# Patient Record
Sex: FEMALE | Race: WHITE | URBAN - METROPOLITAN AREA
[De-identification: names, ages, dates, MRNs, and addresses within clinical notes are randomized per-mention and may not be internally consistent; named-entity substitution may affect disease eponyms.]

---

## 2017-01-30 ENCOUNTER — OFFICE VISIT (OUTPATIENT)
Dept: DERMATOLOGY | Facility: CLINIC | Age: 57
End: 2017-01-30

## 2017-01-30 VITALS — SYSTOLIC BLOOD PRESSURE: 107 MMHG | DIASTOLIC BLOOD PRESSURE: 65 MMHG | HEART RATE: 65 BPM

## 2017-01-30 DIAGNOSIS — M35.9 AUTOIMMUNE DISEASE (H): ICD-10-CM

## 2017-01-30 DIAGNOSIS — L21.9 DERMATITIS, SEBORRHEIC: ICD-10-CM

## 2017-01-30 DIAGNOSIS — L63.9 AA (ALOPECIA AREATA): Primary | ICD-10-CM

## 2017-01-30 ASSESSMENT — PAIN SCALES - GENERAL: PAINLEVEL: NO PAIN (0)

## 2017-01-30 NOTE — LETTER
"1/30/2017       RE: Jessica Marina  [address] [facility] [address]     Dear Colleague,    Thank you for referring your patient, Jessica Marina, to the [facility] DERMATOLOGY at [facility]. Please see a copy of my visit note below.    [facility] Dermatology Note    Dermatology Problem List:  1. Alopecia Areata  -current treatment: alternating clobetasol 0.05% shampoo, ketoconazole 2% shampoo, and DHS-Zinc shampoo daily.    -s/p 2cc ILK 1/30/2017  - Derma-smoothe FS oil before and  after hair dye  -clobetasol 0.05% foam, prn for new areas    2. Dermatitis of L thumb (since exposure to cleaning supplies)  -current treatment: 0.1% TAC ointment bid prn    Encounter Date: Jan 30, 2017    CC:  Chief Complaint   Patient presents with     Derm Problem     Patient comes to clinic today for hairloss. States \"it's worse.\"     History of Present Illness:  Ms. Jessica Marina is a 56 year old female who presents as a follow-up for alopecia areata. The patient was last seen 10/10/16 when she received 2cc ILK injections into her scalp.  She is also currently treating her scalp with alternating ketoconazole 2% shampoo, clobetasol 0.05% shampoo, and DHS-zinc shampoo daily.  She is  applying clobetasol 0.05% foam, prn for pruritic patches.  After patient has her hair colored, she uses the Derma-smoothe oil.      Today, patient reports her hair loss has worsened over the last 6 weeks. She has noticed more hair shedding when she showers and brushes her hair. She thinks it is worse around her L temporal scalp area, but has not noticed any new patches of hair loss. She denies any scalp pain or pruritus. She had two UTIs over the last two months that required abx tx. She otherwise  denies any changes in her medical condition or the initiation of new medications.       Past Medical History:   Patient Active Problem List   Diagnosis     AA (alopecia areata)     Papules     " Dermatitis, seborrheic     Autoimmune disease (H)     Pruritus     Dermatitis     History reviewed. No pertinent past medical history.  Past Surgical History   Procedure Laterality Date     No history of surgery  12/16/13     derm     Social History:  The patient works at [employer].  She is originally from [place].     Family History:  There is no family history of skin cancer.    Medications:  Current Outpatient Prescriptions   Medication Sig Dispense Refill     triamcinolone (KENALOG) 0.1 % ointment Apply topically 2 times daily 15 g 1     ketoconazole (NIZORAL) 2 % shampoo Lather into wet hair three times weekly.  Let sit 3-5 minutes before rinsing. 360 mL 4     VITAMIN D, CHOLECALCIFEROL, PO Take by mouth daily       clobetasol propionate (OLUX) 0.05 % FOAM Externally apply topically daily 100 g 5     clobetasol propionate (CLOBEX) 0.05 % SHAM Apply to dry scalp for 15 minutes, then rinse out.  Use once every 48 hours. 3 Bottle 2     Fluocinolone Acetonide (DERMA-SMOOTHE/FS SCALP) 0.01 % OIL Apply once weekly to scalp as needed for itching 1 Bottle 3     Loratadine (CLARITIN PO) Take  by mouth as needed.       fish oil-omega-3 fatty acids (FISH OIL) 1000 MG capsule Take 2 g by mouth daily.       Magnesium 300 MG CAPS Take  by mouth.       No Known Allergies    Review of Systems:  -As per HPI    Physical exam:  Vitals: /65 mmHg  Pulse 65  GEN: This is a well developed, well-nourished female in no acute distress, in a pleasant mood.    SKIN: Focused examination of the face and scalp was performed.  -decreased hair density and thinning predominantly on frontal scalp hairline and R temporoparietal scalp  -mild follicular accentuation present primarily on vertex and bilateral temporoparietal scalp   -2 cm area of decreased hair density on R occipital scalp  -no hair loss on eyebrows or eyelashes  -No other lesions of concern on areas examined.     Impression/Plan:    1. Alopecia areata: Pt reports increase in  shedding over the last 6 weeks. She is unsure of any direct cause of flare-up. She has had 2 UTIs in the last two months that required tx with abx. Comparison with photographs from last visit, however, suggests some clinical improvement in vertex and L temporparietal scalp. R temporoparietal scalp and frontal hairline do show evidence of decrease hair density. Scalp erythema is much improved, although some follicular accentuation remains.    Kenalog intralesional injection procedure note (performed by Dr. Boyd with medical student assistance): After verbal consent and discussion of risks including but not limited to atrophy, pain, and bruising, cleansing with isopropyl alcohol, time out was performed, 2 total cc of Kenalog 10 mg/cc was injected into approximately 20 sites  on the scalp.  The patient tolerated the procedure well and left the Dermatology clinic in good condition.    Continue alternating clobetasol 0.05% shampoo ketoconazole 2% shampoo, and DHS-zinc shampoo daily.      Continue to use derma-smoothe  FS oil on days when  hair is colored      Continue clobetasol 0.05% foam prn for pruritic areas     photographs taken for future reference     Follow-up in 6-7 weeks, or earlier for new or changing lesions.     Staff Involved:  Scribed by Joey Cortez, MS4, on behalf of Dr. Any Boyd MD on 1/30/2017      I agree with the PFSH and ROS as completed by the Medical Student. The remainder of the encounter was performed by me and scribed by the Medical Student. The scribed note accurately reflects my personal services and the medical decisions made by me. ILK injections were primarily done by me.      Any Boyd MD  Professor and Chair  Department of Dermatology  [facility]                Pictures taken of patient today to be placed in chart for future reference.      Again, thank you for allowing me to participate in the care of your patient.      Sincerely,    Any Mo  MD Oliver

## 2017-01-30 NOTE — PROGRESS NOTES
"[facility] Dermatology Note    Dermatology Problem List:  1. Alopecia Areata  -current treatment: alternating clobetasol 0.05% shampoo, ketoconazole 2% shampoo, and DHS-Zinc shampoo daily.    -s/p 2cc ILK 1/30/2017  - Derma-smoothe FS oil before and  after hair dye  -clobetasol 0.05% foam, prn for new areas    2. Dermatitis of L thumb (since exposure to cleaning supplies)  -current treatment: 0.1% TAC ointment bid prn    Encounter Date: Jan 30, 2017    CC:  Chief Complaint   Patient presents with     Derm Problem     Patient comes to clinic today for hairloss. States \"it's worse.\"     History of Present Illness:  Ms. Jessica Marina is a 56 year old female who presents as a follow-up for alopecia areata. The patient was last seen 10/10/16 when she received 2cc ILK injections into her scalp.  She is also currently treating her scalp with alternating ketoconazole 2% shampoo, clobetasol 0.05% shampoo, and DHS-zinc shampoo daily.  She is  applying clobetasol 0.05% foam, prn for pruritic patches.  After patient has her hair colored, she uses the Derma-smoothe oil.      Today, patient reports her hair loss has worsened over the last 6 weeks. She has noticed more hair shedding when she showers and brushes her hair. She thinks it is worse around her L temporal scalp area, but has not noticed any new patches of hair loss. She denies any scalp pain or pruritus. She had two UTIs over the last two months that required abx tx. She otherwise  denies any changes in her medical condition or the initiation of new medications.       Past Medical History:   Patient Active Problem List   Diagnosis     AA (alopecia areata)     Papules     Dermatitis, seborrheic     Autoimmune disease (H)     Pruritus     Dermatitis     History reviewed. No pertinent past medical history.  Past Surgical History   Procedure Laterality Date     No history of surgery  12/16/13     derm     Social History:  The patient works at [employer].  She " is originally from [place].     Family History:  There is no family history of skin cancer.    Medications:  Current Outpatient Prescriptions   Medication Sig Dispense Refill     triamcinolone (KENALOG) 0.1 % ointment Apply topically 2 times daily 15 g 1     ketoconazole (NIZORAL) 2 % shampoo Lather into wet hair three times weekly.  Let sit 3-5 minutes before rinsing. 360 mL 4     VITAMIN D, CHOLECALCIFEROL, PO Take by mouth daily       clobetasol propionate (OLUX) 0.05 % FOAM Externally apply topically daily 100 g 5     clobetasol propionate (CLOBEX) 0.05 % SHAM Apply to dry scalp for 15 minutes, then rinse out.  Use once every 48 hours. 3 Bottle 2     Fluocinolone Acetonide (DERMA-SMOOTHE/FS SCALP) 0.01 % OIL Apply once weekly to scalp as needed for itching 1 Bottle 3     Loratadine (CLARITIN PO) Take  by mouth as needed.       fish oil-omega-3 fatty acids (FISH OIL) 1000 MG capsule Take 2 g by mouth daily.       Magnesium 300 MG CAPS Take  by mouth.       No Known Allergies    Review of Systems:  -As per HPI    Physical exam:  Vitals: /65 mmHg  Pulse 65  GEN: This is a well developed, well-nourished female in no acute distress, in a pleasant mood.    SKIN: Focused examination of the face and scalp was performed.  -decreased hair density and thinning predominantly on frontal scalp hairline and R temporoparietal scalp  -mild follicular accentuation present primarily on vertex and bilateral temporoparietal scalp   -2 cm area of decreased hair density on R occipital scalp  -no hair loss on eyebrows or eyelashes  -No other lesions of concern on areas examined.     Impression/Plan:    1. Alopecia areata: Pt reports increase in shedding over the last 6 weeks. She is unsure of any direct cause of flare-up. She has had 2 UTIs in the last two months that required tx with abx. Comparison with photographs from last visit, however, suggests some clinical improvement in vertex and L temporparietal scalp. R  temporoparietal scalp and frontal hairline do show evidence of decrease hair density. Scalp erythema is much improved, although some follicular accentuation remains.    Kenalog intralesional injection procedure note (performed by Dr. Boyd with medical student assistance): After verbal consent and discussion of risks including but not limited to atrophy, pain, and bruising, cleansing with isopropyl alcohol, time out was performed, 2 total cc of Kenalog 10 mg/cc was injected into approximately 20 sites  on the scalp.  The patient tolerated the procedure well and left the Dermatology clinic in good condition.    Continue alternating clobetasol 0.05% shampoo ketoconazole 2% shampoo, and DHS-zinc shampoo daily.      Continue to use derma-smoothe  FS oil on days when  hair is colored      Continue clobetasol 0.05% foam prn for pruritic areas     photographs taken for future reference     Follow-up in 6-7 weeks, or earlier for new or changing lesions.     Staff Involved:  Scribed by Joey Cortez, MS4, on behalf of Dr. Any Boyd MD on 1/30/2017      I agree with the PFSH and ROS as completed by the Medical Student. The remainder of the encounter was performed by me and scribed by the Medical Student. The scribed note accurately reflects my personal services and the medical decisions made by me. ILK injections were primarily done by me.      Any Boyd MD  Professor and Chair  Department of Dermatology  [facility]

## 2017-01-30 NOTE — PATIENT INSTRUCTIONS
-Return in 6-7 weeks. Feel free to cancel your appt and make it farther away if you are improving satisfactorily.  -Continue to use your 3 shampoo cycle. You are doing a great job!

## 2017-01-30 NOTE — MR AVS SNAPSHOT
After Visit Summary   1/30/2017    Jessica Marina    MRN: [id number]           Patient Information     Date Of Birth          1960        Visit Information        Provider Department      1/30/2017 8:45 AM Any Boyd MD [facility] Dermatology        Today's Diagnoses     AA (alopecia areata)    -  1    Dermatitis, seborrheic        Autoimmune disease (H)          Care Instructions    -Return in 6-7 weeks. Feel free to cancel your appt and make it farther away if you are improving satisfactorily.  -Continue to use your 3 shampoo cycle. You are doing a great job!        Follow-ups after your visit        Your next 10 appointments already scheduled     May 03, 2017  8:45 AM CDT   (Arrive by 8:30 AM)   Return Visit with CELIA Galvez [facility] Dermatology ([facility])    [address] [place] [id number] [phone]              Who to contact     Please call your clinic at [phone] to:    Ask questions about your health    Make or cancel appointments    Discuss your medicines    Learn about your test results    Speak to your doctor   If you have compliments or concerns about an experience at your clinic, or if you wish to file a complaint, please contact [facility] Patient Relations at [phone] or email us at [email or web address]         Additional Information About Your Visit        MyChart Information     [employer] is an electronic gateway that provides easy, online access to your medical records. With [employer] you can request a clinic appointment, read your test results, renew a prescription or communicate with your care team.     To sign up for [employer] visit the website at [email or web address]   You will be asked to enter the access code listed below, as well as some personal information. Please follow the directions to create your username and password.      Your access code is: [email or web address]  Expires: 2017  7:30 AM     Your access code will  in 90 days. If you need help or a new code, please contact your [facility] or call [phone] for assistance.        Care EveryWhere ID     This is your Care EveryWhere ID. This could be used by other organizations to access your [facility] medical records  [id number]        Your Vitals Were     Pulse                   65            Blood Pressure from Last 3 Encounters:   17 107/65   10/10/16 98/59   16 97/66    Weight from Last 3 Encounters:   16 54.7 kg (120 lb 9.6 oz)   12/07/15 53.5 kg (118 lb)   09/03/15 53 kg (116 lb 12.8 oz)              We Performed the Following     INJECTION INTO SKIN LESIONS >7          Today's Medication Changes          These changes are accurate as of: 17 11:59 PM.  If you have any questions, ask your nurse or doctor.               Start taking these medicines.        Dose/Directions    triamcinolone acetonide 10 MG/ML injection   Commonly known as:  KENALOG   Used for:  AA (alopecia areata)   Started by:  Any Boyd MD        See med note   Quantity:  5 mL   Refills:  0            Where to get your medicines      Some of these will need a paper prescription and others can be bought over the counter.  Ask your nurse if you have questions.     You don't need a prescription for these medications     triamcinolone acetonide 10 MG/ML injection                Primary Care Provider    None Specified       No primary provider on file.        Thank you!     Thank you for choosing [facility] DERMATOLOGY  for your care. Our goal is always to provide you with excellent care. Hearing back from our patients is one way we can continue to improve our services. Please take a few minutes to complete the written survey that you may receive in the mail after your visit with us. Thank you!             Your Updated Medication List -  Protect others around you: Learn how to safely use, store and throw away your medicines at www.disposemymeds.org.          This list is accurate as of: 1/30/17 11:59 PM.  Always use your most recent med list.                   Brand Name Dispense Instructions for use    CLARITIN PO      Take  by mouth as needed.       * clobetasol propionate 0.05 % Foam    OLUX    100 g    Externally apply topically daily       * clobetasol propionate 0.05 % Sham    CLOBEX    3 Bottle    Apply to dry scalp for 15 minutes, then rinse out.  Use once every 48 hours.       DERMA-SMOOTHE/FS SCALP 0.01 % Oil     1 Bottle    Apply once weekly to scalp as needed for itching       fish oil-omega-3 fatty acids 1000 MG capsule      Take 2 g by mouth daily.       ketoconazole 2 % shampoo    NIZORAL    360 mL    Lather into wet hair three times weekly.  Let sit 3-5 minutes before rinsing.       Magnesium 300 MG Caps      Take  by mouth.       triamcinolone 0.1 % ointment    KENALOG    15 g    Apply topically 2 times daily       triamcinolone acetonide 10 MG/ML injection    KENALOG    5 mL    See med note       VITAMIN D (CHOLECALCIFEROL) PO      Take by mouth daily       * Notice:  This list has 2 medication(s) that are the same as other medications prescribed for you. Read the directions carefully, and ask your doctor or other care provider to review them with you.

## 2017-01-30 NOTE — NURSING NOTE
"Dermatology Rooming Note    Jessica Marina's goals for this visit include:   Chief Complaint   Patient presents with     Derm Problem     Patient comes to clinic today for hairloss. States \"it's worse.\"     Skye Perez, [facility]    "

## 2017-03-08 ENCOUNTER — OFFICE VISIT (OUTPATIENT)
Dept: DERMATOLOGY | Facility: CLINIC | Age: 57
End: 2017-03-08

## 2017-03-08 DIAGNOSIS — L63.9 ALOPECIA AREATA: Primary | ICD-10-CM

## 2017-03-08 ASSESSMENT — PAIN SCALES - GENERAL: PAINLEVEL: NO PAIN (0)

## 2017-03-08 NOTE — LETTER
"3/8/2017       RE: Jessica Marina  [address] [facility] [address] [facility] [address]     Dear Colleague,    Thank you for referring your patient, Jessica Marina, to the [facility] DERMATOLOGY at [facility]. Please see a copy of my visit note below.                      Pictures were placed in Pt's chart today for future reference.          [facility] Dermatology Note    Dermatology Problem List:  1. Alopecia Areata  -current treatment: alternating clobetasol 0.05% shampoo, ketoconazole 2% shampoo, and DHS-Zinc shampoo daily.    -s/p 2cc ILK 1/30/2017  - Derma-smoothe oil after hair dye  -clobetasol 0.05% foam, prn for new areas    2. Dermatitis of L thumb (since exposure to cleaning supplies)  -current treatment: 0.1% TAC ointment bid prn    Encounter Date: Mar 8, 2017    CC:  Chief Complaint   Patient presents with     Hair Loss     Hair loss, Jessica states \" I notice less falling out.\"     History of Present Illness:  Ms. Jessica Marina is a 57 year old female who presents as a follow-up for alopecia areata. The patient was last seen 1/30/2017 when  she received 2cc ILK injections into her scalp. She states she has noticed less hair falling from last visit but still feels like her hair is thin in the frontal areas. She is also currently treating her scalp with alternating ketoconazole 2% shampoo, clobetasol 0.05% shampoo, and DHS-zinc shampoo daily.  She is applying clobetasol 0.05% foam, prn for pruritic patches.  After patient has her hair colored, she uses the Derma-smoothe oil. She plans on having her hair colored at the end of the week.     She had an evaluation for her chronic UTIs with no concerning findings. She denies any abominal pain, fever or unexplained weight loss. She is feeling well.       Past Medical History:   Patient Active Problem List   Diagnosis     AA (alopecia areata)     Papules     Dermatitis, seborrheic     Autoimmune disease (H)     " Pruritus     Dermatitis     No past medical history on file.  Past Surgical History   Procedure Laterality Date     No history of surgery  12/16/13     derm     Social History:  The patient works at [employer].  She is originally from [place].     Family History:  There is no family history of skin cancer.    Medications:  Current Outpatient Prescriptions   Medication Sig Dispense Refill     triamcinolone (KENALOG) 0.1 % ointment Apply topically 2 times daily 15 g 1     ketoconazole (NIZORAL) 2 % shampoo Lather into wet hair three times weekly.  Let sit 3-5 minutes before rinsing. 360 mL 4     VITAMIN D, CHOLECALCIFEROL, PO Take by mouth daily       clobetasol propionate (OLUX) 0.05 % FOAM Externally apply topically daily 100 g 5     clobetasol propionate (CLOBEX) 0.05 % SHAM Apply to dry scalp for 15 minutes, then rinse out.  Use once every 48 hours. 3 Bottle 2     Fluocinolone Acetonide (DERMA-SMOOTHE/FS SCALP) 0.01 % OIL Apply once weekly to scalp as needed for itching 1 Bottle 3     Loratadine (CLARITIN PO) Take  by mouth as needed.       fish oil-omega-3 fatty acids (FISH OIL) 1000 MG capsule Take 2 g by mouth daily.       Magnesium 300 MG CAPS Take  by mouth.       No Known Allergies    Review of Systems:  -As per HPI    Physical exam:  Vitals: There were no vitals taken for this visit.  GEN: This is a well developed, well-nourished female in no acute distress, in a pleasant mood.    SKIN: Focused examination of the face and scalp was performed.  -Decreased hair density and thinning predominantly on frontal scalp hairline and bilateral temporoparietal scalp  -mild follicular accentuation present primarily on the bilateral temporoparietal scalp   -no hair loss on eyebrows or eyelashes  -No other lesions of concern on areas examined.     Impression/Plan:    1. Alopecia areata: Improvement compared to last visit especially the occipital scalp does not show any decreased hair density.  No scalp erythema today.      Kenalog intralesional injection procedure note (performed by me): After verbal consent and discussion of risks including but not limited to atrophy, pain, and bruising, cleansing with isopropyl alcohol, time out was performed, 2 total cc of Kenalog 10 mg/cc was injected into approximately 20 sites  on the scalp.  The patient tolerated the procedure well and left the Dermatology clinic in good condition.    Continue alternating clobetasol 0.05% shampoo ketoconazole 2% shampoo, and DHS-zinc shampoo daily.      Continue to use derma-smoothe oil on days when  hair is colored      Continue clobetasol 0.05% foam prn for pruritic areas     photographs taken for future reference     Follow-up in 8 weeks, or earlier for new or changing lesions.     Staff Involved:    All risks, benefits and alternatives were discussed with patient.  Patient is in agreement and understands the assessment and plan.  All questions were answered.  Sun Screen Education was given.   Return to Clinic in 8 weeks or sooner as needed.   Xi Fish PA-C

## 2017-03-08 NOTE — PROGRESS NOTES
"[facility] Dermatology Note    Dermatology Problem List:  1. Alopecia Areata  -current treatment: alternating clobetasol 0.05% shampoo, ketoconazole 2% shampoo, and DHS-Zinc shampoo daily.    -s/p 2cc ILK 1/30/2017  - Derma-smoothe oil after hair dye  -clobetasol 0.05% foam, prn for new areas    2. Dermatitis of L thumb (since exposure to cleaning supplies)  -current treatment: 0.1% TAC ointment bid prn    Encounter Date: Mar 8, 2017    CC:  Chief Complaint   Patient presents with     Hair Loss     Hair loss, Jessica states \" I notice less falling out.\"     History of Present Illness:  Ms. Jessica Marina is a 57 year old female who presents as a follow-up for alopecia areata. The patient was last seen 1/30/2017 when  she received 2cc ILK injections into her scalp. She states she has noticed less hair falling from last visit but still feels like her hair is thin in the frontal areas. She is also currently treating her scalp with alternating ketoconazole 2% shampoo, clobetasol 0.05% shampoo, and DHS-zinc shampoo daily.  She is applying clobetasol 0.05% foam, prn for pruritic patches.  After patient has her hair colored, she uses the Derma-smoothe oil. She plans on having her hair colored at the end of the week.     She had an evaluation for her chronic UTIs with no concerning findings. She denies any abominal pain, fever or unexplained weight loss. She is feeling well.       Past Medical History:   Patient Active Problem List   Diagnosis     AA (alopecia areata)     Papules     Dermatitis, seborrheic     Autoimmune disease (H)     Pruritus     Dermatitis     No past medical history on file.  Past Surgical History   Procedure Laterality Date     No history of surgery  12/16/13     derm     Social History:  The patient works at [employer].  She is originally from [place].     Family History:  There is no family history of skin cancer.    Medications:  Current Outpatient Prescriptions   Medication Sig " Dispense Refill     triamcinolone (KENALOG) 0.1 % ointment Apply topically 2 times daily 15 g 1     ketoconazole (NIZORAL) 2 % shampoo Lather into wet hair three times weekly.  Let sit 3-5 minutes before rinsing. 360 mL 4     VITAMIN D, CHOLECALCIFEROL, PO Take by mouth daily       clobetasol propionate (OLUX) 0.05 % FOAM Externally apply topically daily 100 g 5     clobetasol propionate (CLOBEX) 0.05 % SHAM Apply to dry scalp for 15 minutes, then rinse out.  Use once every 48 hours. 3 Bottle 2     Fluocinolone Acetonide (DERMA-SMOOTHE/FS SCALP) 0.01 % OIL Apply once weekly to scalp as needed for itching 1 Bottle 3     Loratadine (CLARITIN PO) Take  by mouth as needed.       fish oil-omega-3 fatty acids (FISH OIL) 1000 MG capsule Take 2 g by mouth daily.       Magnesium 300 MG CAPS Take  by mouth.       No Known Allergies    Review of Systems:  -As per HPI    Physical exam:  Vitals: There were no vitals taken for this visit.  GEN: This is a well developed, well-nourished female in no acute distress, in a pleasant mood.    SKIN: Focused examination of the face and scalp was performed.  -Decreased hair density and thinning predominantly on frontal scalp hairline and bilateral temporoparietal scalp  -mild follicular accentuation present primarily on the bilateral temporoparietal scalp   -no hair loss on eyebrows or eyelashes  -No other lesions of concern on areas examined.     Impression/Plan:    1. Alopecia areata: Improvement compared to last visit especially the occipital scalp does not show any decreased hair density.  No scalp erythema today.     Kenalog intralesional injection procedure note (performed by me): After verbal consent and discussion of risks including but not limited to atrophy, pain, and bruising, cleansing with isopropyl alcohol, time out was performed, 2 total cc of Kenalog 10 mg/cc was injected into approximately 20 sites  on the scalp.  The patient tolerated the procedure well and left the  Dermatology clinic in good condition.    Continue alternating clobetasol 0.05% shampoo ketoconazole 2% shampoo, and DHS-zinc shampoo daily.      Continue to use derma-smoothe oil on days when  hair is colored      Continue clobetasol 0.05% foam prn for pruritic areas     photographs taken for future reference     Follow-up in 8 weeks, or earlier for new or changing lesions.     Staff Involved:    All risks, benefits and alternatives were discussed with patient.  Patient is in agreement and understands the assessment and plan.  All questions were answered.  Sun Screen Education was given.   Return to Clinic in 8 weeks or sooner as needed.   Xi Fish PA-C

## 2017-03-08 NOTE — MR AVS SNAPSHOT
After Visit Summary   3/8/2017    Jessica Marina    MRN: [id number]           Patient Information     Date Of Birth          1960        Visit Information        Provider Department      3/8/2017 9:00 AM Xi Fish PA-C [facility] Dermatology        Today's Diagnoses     Alopecia areata    -  1       Follow-ups after your visit        Follow-up notes from your care team     Return in about 8 weeks (around 5/3/2017).      Your next 10 appointments already scheduled     May 03, 2017  8:45 AM CDT   (Arrive by 8:30 AM)   Return Visit with CELIA Galvez [facility] Dermatology ([facility])    [address] [address] [phone]              Who to contact     Please call your clinic at [phone] to:    Ask questions about your health    Make or cancel appointments    Discuss your medicines    Learn about your test results    Speak to your doctor   If you have compliments or concerns about an experience at your clinic, or if you wish to file a complaint, please contact [facility] Patient Relations at [phone] or email us at [email or web address]         Additional Information About Your Visit        MyChart Information     CenTrakt is an electronic gateway that provides easy, online access to your medical records. With [employer], you can request a clinic appointment, read your test results, renew a prescription or communicate with your care team.     To sign up for CenTrakt visit the website at [email or web address]   You will be asked to enter the access code listed below, as well as some personal information. Please follow the directions to create your username and password.     Your access code is: [email or web address]  Expires: 2017  6:30 AM     Your access code will  in 90 days. If you need help or a new code, please contact your [facility]  Clinic or call [phone] for assistance.        Care EveryWhere ID     This is your Care EveryWhere ID. This could be used by other organizations to access your [place] medical records  [id number]         Blood Pressure from Last 3 Encounters:   01/30/17 107/65   10/10/16 98/59   07/18/16 97/66    Weight from Last 3 Encounters:   05/31/16 54.7 kg (120 lb 9.6 oz)   12/07/15 53.5 kg (118 lb)   09/03/15 53 kg (116 lb 12.8 oz)              We Performed the Following     INJECTION INTO SKIN LESIONS >7          Today's Medication Changes          These changes are accurate as of: 3/8/17 11:59 PM.  If you have any questions, ask your nurse or doctor.               Start taking these medicines.        Dose/Directions    triamcinolone acetonide 10 MG/ML injection   Commonly known as:  KENALOG   Used for:  Alopecia areata        Dose:  10 mg   Inject 1 mL (10 mg) into the skin once for 1 dose   Quantity:  5 mL   Refills:  0            Where to get your medicines      Some of these will need a paper prescription and others can be bought over the counter.  Ask your nurse if you have questions.     You don't need a prescription for these medications     triamcinolone acetonide 10 MG/ML injection                Primary Care Provider    None Specified       No primary provider on file.        Thank you!     Thank you for choosing [facility] DERMATOLOGY  for your care. Our goal is always to provide you with excellent care. Hearing back from our patients is one way we can continue to improve our services. Please take a few minutes to complete the written survey that you may receive in the mail after your visit with us. Thank you!             Your Updated Medication List - Protect others around you: Learn how to safely use, store and throw away your medicines at www.disposemymeds.org.          This list is accurate as of: 3/8/17 11:59 PM.  Always use your most recent med list.                   Brand Name Dispense Instructions  for use    CLARITIN PO      Take  by mouth as needed.       * clobetasol propionate 0.05 % Foam    OLUX    100 g    Externally apply topically daily       * clobetasol propionate 0.05 % Sham    CLOBEX    3 Bottle    Apply to dry scalp for 15 minutes, then rinse out.  Use once every 48 hours.       DERMA-SMOOTHE/FS SCALP 0.01 % Oil     1 Bottle    Apply once weekly to scalp as needed for itching       fish oil-omega-3 fatty acids 1000 MG capsule      Take 2 g by mouth daily.       ketoconazole 2 % shampoo    NIZORAL    360 mL    Lather into wet hair three times weekly.  Let sit 3-5 minutes before rinsing.       Magnesium 300 MG Caps      Take  by mouth.       triamcinolone 0.1 % ointment    KENALOG    15 g    Apply topically 2 times daily       triamcinolone acetonide 10 MG/ML injection    KENALOG    5 mL    Inject 1 mL (10 mg) into the skin once for 1 dose       VITAMIN D (CHOLECALCIFEROL) PO      Take by mouth daily       * Notice:  This list has 2 medication(s) that are the same as other medications prescribed for you. Read the directions carefully, and ask your doctor or other care provider to review them with you.

## 2017-03-08 NOTE — NURSING NOTE
"Dermatology Rooming Note    Jessica Marina's goals for this visit include:   Chief Complaint   Patient presents with     Hair Loss     Hair loss, Jessica states \" I notice less falling out.\"     Maribel Washington LPN  "

## 2017-03-08 NOTE — NURSING NOTE
Drug Administration Record    Drug Name: triamcinolone acetonide(kenalog)  Dose:2 mL  Route administered: ID  NDC #: Kenalog-10 ([address]-70)  Amount of waste(mL):3  Reason for waste: Single use vial

## 2017-05-03 ENCOUNTER — OFFICE VISIT (OUTPATIENT)
Dept: DERMATOLOGY | Facility: CLINIC | Age: 57
End: 2017-05-03

## 2017-05-03 DIAGNOSIS — L63.9 ALOPECIA AREATA: Primary | ICD-10-CM

## 2017-05-03 ASSESSMENT — PAIN SCALES - GENERAL: PAINLEVEL: NO PAIN (0)

## 2017-05-03 NOTE — PROGRESS NOTES
"[facility] Dermatology Note    Dermatology Problem List:  1. Alopecia Areata  -current treatment: alternating clobetasol 0.05% shampoo, ketoconazole 2% shampoo, and DHS-Zinc shampoo daily.    -s/p 2cc ILK 1/30/2017  - Derma-smoothe oil after hair dye  -clobetasol 0.05% foam, prn for new areas    2. Dermatitis of L thumb (since exposure to cleaning supplies)  -current treatment: 0.1% TAC ointment bid prn    Encounter Date: May 3, 2017    CC:  Chief Complaint   Patient presents with     Hair Loss     Hair loss, Jessica states \" it is still coming out.\"     History of Present Illness:  Ms. Jessica Marina is a 57 year old female who presents as a follow-up for alopecia areata. The patient was last seen 3/8/2017 when she had 2 cc ILK injections into her scalp. She states she has noticed less hair falling from last visit but still feels like her hair is thin in the frontal areas. She is also currently treating her scalp with alternating ketoconazole 2% shampoo, clobetasol 0.05% shampoo, and DHS-zinc shampoo daily.  She is applying clobetasol 0.05% foam, prn for pruritic patches.  After patient has her hair colored, she uses the Derma-smoothe oil.      She has no other skin concerns. She is feeling well.       Past Medical History:   Patient Active Problem List   Diagnosis     AA (alopecia areata)     Papules     Dermatitis, seborrheic     Autoimmune disease (H)     Pruritus     Dermatitis     No past medical history on file.  Past Surgical History:   Procedure Laterality Date     NO HISTORY OF SURGERY  12/16/13    derm     Social History:  The patient works at [employer].  She is originally from [place].     Family History:  There is no family history of skin cancer.    Medications:  Current Outpatient Prescriptions   Medication Sig Dispense Refill     triamcinolone (KENALOG) 0.1 % ointment Apply topically 2 times daily 15 g 1     ketoconazole (NIZORAL) 2 % shampoo Lather into wet hair three times " weekly.  Let sit 3-5 minutes before rinsing. 360 mL 4     VITAMIN D, CHOLECALCIFEROL, PO Take by mouth daily       clobetasol propionate (OLUX) 0.05 % FOAM Externally apply topically daily 100 g 5     clobetasol propionate (CLOBEX) 0.05 % SHAM Apply to dry scalp for 15 minutes, then rinse out.  Use once every 48 hours. 3 Bottle 2     Fluocinolone Acetonide (DERMA-SMOOTHE/FS SCALP) 0.01 % OIL Apply once weekly to scalp as needed for itching 1 Bottle 3     Loratadine (CLARITIN PO) Take  by mouth as needed.       fish oil-omega-3 fatty acids (FISH OIL) 1000 MG capsule Take 2 g by mouth daily.       Magnesium 300 MG CAPS Take  by mouth.       Allergies   Allergen Reactions     Trifluridine      PN: LW Reaction: CAUSES HERPES SYMPTOMS       Review of Systems:  -As per HPI    Physical exam:  Vitals: There were no vitals taken for this visit.  GEN: This is a well developed, well-nourished female in no acute distress, in a pleasant mood.    SKIN: Focused examination of the face and scalp was performed.  There is evidence of regrowth with central part regrowth layers of 1,2 and 3 cm.   -Decreased hair density and thinning predominantly on frontal scalp hairline and bilateral temporoparietal scalp  -mild follicular accentuation present primarily on the bilateral temporoparietal scalp   -no hair loss on eyebrows or eyelashes  -No other lesions of concern on areas examined.     Impression/Plan:    1. Alopecia areata: Evidence of regrowth but continued thinning through out the bilateral temporoparietal compared to last visit. No scalp erythema today.     Kenalog intralesional injection procedure note (performed by me): After verbal consent and discussion of risks including but not limited to atrophy, pain, and bruising, cleansing with isopropyl alcohol, time out was performed, 3 total cc of Kenalog 10 mg/cc was injected into approximately 35 sites  on the scalp.  The patient tolerated the procedure well and left the Dermatology  clinic in good condition.    Continue alternating clobetasol 0.05% shampoo ketoconazole 2% shampoo, and DHS-zinc shampoo daily.      Continue to use derma-smoothe oil on days when  hair is colored      Continue clobetasol 0.05% foam prn for pruritic areas     photographs taken for future reference     Follow-up in 8 weeks, or earlier for new or changing lesions.     Staff Involved:    All risks, benefits and alternatives were discussed with patient.  Patient is in agreement and understands the assessment and plan.  All questions were answered.  Sun Screen Education was given.   Return to Clinic in 8 weeks or sooner as needed.   Xi Fish PA-C

## 2017-05-03 NOTE — MR AVS SNAPSHOT
After Visit Summary   5/3/2017    Jessica Marina    MRN: [id number]           Patient Information     Date Of Birth          1960        Visit Information        Provider Department      5/3/2017 8:45 AM Xi Fish PA-C [facility] Dermatology        Today's Diagnoses     Alopecia areata    -  1       Follow-ups after your visit        Follow-up notes from your care team     Return in about 8 weeks (around 2017).      Your next 10 appointments already scheduled     2017  7:30 AM CDT   (Arrive by 7:15 AM)   RETURN HAIRLOSS with Any Boyd MD   [facility] Dermatology ([facility] [place])    [address] [place] [address] [phone]              Who to contact     Please call your clinic at [phone] to:    Ask questions about your health    Make or cancel appointments    Discuss your medicines    Learn about your test results    Speak to your doctor   If you have compliments or concerns about an experience at your clinic, or if you wish to file a complaint, please contact [facility] Patient Relations at [phone] or email us at [email or web address]         Additional Information About Your Visit        MyChart Information     StudioSnapst is an electronic gateway that provides easy, online access to your medical records. With [employer], you can request a clinic appointment, read your test results, renew a prescription or communicate with your care team.     To sign up for Studio[employer]st visit the website at [email or web address]   You will be asked to enter the access code listed below, as well as some personal information. Please follow the directions to create your username and password.     Your access code is: [email or web address]  Expires: 2017  6:31 AM     Your access code will  in 90 days. If you need help or a new code, please contact your [facility]  Physicians Clinic or call [phone] for assistance.        Care EveryWhere ID     This is your Care EveryWhere ID. This could be used by other organizations to access your [place] medical records  [id number]         Blood Pressure from Last 3 Encounters:   01/30/17 107/65   10/10/16 98/59   07/18/16 97/66    Weight from Last 3 Encounters:   05/31/16 54.7 kg (120 lb 9.6 oz)   12/07/15 53.5 kg (118 lb)   09/03/15 53 kg (116 lb 12.8 oz)              We Performed the Following     INJECTION INTO SKIN LESIONS <=7          Today's Medication Changes          These changes are accurate as of: 5/3/17 11:59 PM.  If you have any questions, ask your nurse or doctor.               Start taking these medicines.        Dose/Directions    triamcinolone acetonide 10 MG/ML injection   Commonly known as:  KENALOG   Used for:  Alopecia areata        Dose:  10 mg   Inject 1 mL (10 mg) into the skin once for 1 dose   Quantity:  5 mL   Refills:  0            Where to get your medicines      Some of these will need a paper prescription and others can be bought over the counter.  Ask your nurse if you have questions.     You don't need a prescription for these medications     triamcinolone acetonide 10 MG/ML injection                Primary Care Provider    None Specified       No primary provider on file.        Thank you!     Thank you for choosing [facility] DERMATOLOGY  for your care. Our goal is always to provide you with excellent care. Hearing back from our patients is one way we can continue to improve our services. Please take a few minutes to complete the written survey that you may receive in the mail after your visit with us. Thank you!             Your Updated Medication List - Protect others around you: Learn how to safely use, store and throw away your medicines at www.disposemymeds.org.          This list is accurate as of: 5/3/17 11:59 PM.  Always use your most recent med list.                   Brand Name Dispense  Instructions for use    CLARITIN PO      Take  by mouth as needed.       * clobetasol propionate 0.05 % Foam    OLUX    100 g    Externally apply topically daily       * clobetasol propionate 0.05 % Sham    CLOBEX    3 Bottle    Apply to dry scalp for 15 minutes, then rinse out.  Use once every 48 hours.       DERMA-SMOOTHE/FS SCALP 0.01 % Oil     1 Bottle    Apply once weekly to scalp as needed for itching       fish oil-omega-3 fatty acids 1000 MG capsule      Take 2 g by mouth daily.       ketoconazole 2 % shampoo    NIZORAL    360 mL    Lather into wet hair three times weekly.  Let sit 3-5 minutes before rinsing.       Magnesium 300 MG Caps      Take  by mouth.       triamcinolone 0.1 % ointment    KENALOG    15 g    Apply topically 2 times daily       triamcinolone acetonide 10 MG/ML injection    KENALOG    5 mL    Inject 1 mL (10 mg) into the skin once for 1 dose       VITAMIN D (CHOLECALCIFEROL) PO      Take by mouth daily       * Notice:  This list has 2 medication(s) that are the same as other medications prescribed for you. Read the directions carefully, and ask your doctor or other care provider to review them with you.

## 2017-05-03 NOTE — NURSING NOTE
"Dermatology Rooming Note    Jessica Marina's goals for this visit include:   Chief Complaint   Patient presents with     Hair Loss     Hair loss, Jessica states \" it is still coming out.\"     Maribel Washington LPN  "

## 2017-05-03 NOTE — LETTER
"5/3/2017       RE: Jessica Marina  [address] [facility] [address]     Dear Colleague,    Thank you for referring your patient, Jessica Marina, to the [facility] DERMATOLOGY at [facility]. Please see a copy of my visit note below.                          Pictures were placed in Pt's chart today for future reference.      [facility] Dermatology Note    Dermatology Problem List:  1. Alopecia Areata  -current treatment: alternating clobetasol 0.05% shampoo, ketoconazole 2% shampoo, and DHS-Zinc shampoo daily.    -s/p 2cc ILK 1/30/2017  - Derma-smoothe oil after hair dye  -clobetasol 0.05% foam, prn for new areas    2. Dermatitis of L thumb (since exposure to cleaning supplies)  -current treatment: 0.1% TAC ointment bid prn    Encounter Date: May 3, 2017    CC:  Chief Complaint   Patient presents with     Hair Loss     Hair loss, Jessica states \" it is still coming out.\"     History of Present Illness:  Ms. Jessica Marina is a 57 year old female who presents as a follow-up for alopecia areata. The patient was last seen 3/8/2017 when she had 2 cc ILK injections into her scalp. She states she has noticed less hair falling from last visit but still feels like her hair is thin in the frontal areas. She is also currently treating her scalp with alternating ketoconazole 2% shampoo, clobetasol 0.05% shampoo, and DHS-zinc shampoo daily.  She is applying clobetasol 0.05% foam, prn for pruritic patches.  After patient has her hair colored, she uses the Derma-smoothe oil.      She has no other skin concerns. She is feeling well.       Past Medical History:   Patient Active Problem List   Diagnosis     AA (alopecia areata)     Papules     Dermatitis, seborrheic     Autoimmune disease (H)     Pruritus     Dermatitis     No past medical history on file.  Past Surgical History:   Procedure Laterality Date     NO HISTORY OF SURGERY  12/16/13    derm     Social " History:  The patient works at [employer].  She is originally from [place].     Family History:  There is no family history of skin cancer.    Medications:  Current Outpatient Prescriptions   Medication Sig Dispense Refill     triamcinolone (KENALOG) 0.1 % ointment Apply topically 2 times daily 15 g 1     ketoconazole (NIZORAL) 2 % shampoo Lather into wet hair three times weekly.  Let sit 3-5 minutes before rinsing. 360 mL 4     VITAMIN D, CHOLECALCIFEROL, PO Take by mouth daily       clobetasol propionate (OLUX) 0.05 % FOAM Externally apply topically daily 100 g 5     clobetasol propionate (CLOBEX) 0.05 % SHAM Apply to dry scalp for 15 minutes, then rinse out.  Use once every 48 hours. 3 Bottle 2     Fluocinolone Acetonide (DERMA-SMOOTHE/FS SCALP) 0.01 % OIL Apply once weekly to scalp as needed for itching 1 Bottle 3     Loratadine (CLARITIN PO) Take  by mouth as needed.       fish oil-omega-3 fatty acids (FISH OIL) 1000 MG capsule Take 2 g by mouth daily.       Magnesium 300 MG CAPS Take  by mouth.       Allergies   Allergen Reactions     Trifluridine      PN: LW Reaction: CAUSES HERPES SYMPTOMS       Review of Systems:  -As per HPI    Physical exam:  Vitals: There were no vitals taken for this visit.  GEN: This is a well developed, well-nourished female in no acute distress, in a pleasant mood.    SKIN: Focused examination of the face and scalp was performed.  There is evidence of regrowth with central part regrowth layers of 1,2 and 3 cm.   -Decreased hair density and thinning predominantly on frontal scalp hairline and bilateral temporoparietal scalp  -mild follicular accentuation present primarily on the bilateral temporoparietal scalp   -no hair loss on eyebrows or eyelashes  -No other lesions of concern on areas examined.     Impression/Plan:    1. Alopecia areata: Evidence of regrowth but continued thinning through out the bilateral temporoparietal compared to last visit. No scalp erythema today.     Kenalog  intralesional injection procedure note (performed by me): After verbal consent and discussion of risks including but not limited to atrophy, pain, and bruising, cleansing with isopropyl alcohol, time out was performed, 3 total cc of Kenalog 10 mg/cc was injected into approximately 35 sites  on the scalp.  The patient tolerated the procedure well and left the Dermatology clinic in good condition.    Continue alternating clobetasol 0.05% shampoo ketoconazole 2% shampoo, and DHS-zinc shampoo daily.      Continue to use derma-smoothe oil on days when  hair is colored      Continue clobetasol 0.05% foam prn for pruritic areas     photographs taken for future reference     Follow-up in 8 weeks, or earlier for new or changing lesions.     Staff Involved:    All risks, benefits and alternatives were discussed with patient.  Patient is in agreement and understands the assessment and plan.  All questions were answered.  Sun Screen Education was given.   Return to Clinic in 8 weeks or sooner as needed.   Xi Fish PA-C

## 2017-05-03 NOTE — NURSING NOTE
Drug Administration Record    Drug Name: triamcinolone acetonide(kenalog)  Dose:3 mL  Route administered: ID  NDC #: Kenalog-10 ([address]-28)  Amount of waste(mL):2  Reason for waste: Single use vial

## 2017-06-12 ENCOUNTER — OFFICE VISIT (OUTPATIENT)
Dept: DERMATOLOGY | Facility: CLINIC | Age: 57
End: 2017-06-12

## 2017-06-12 DIAGNOSIS — L63.9 ALOPECIA AREATA: Primary | ICD-10-CM

## 2017-06-12 DIAGNOSIS — L90.9 SKIN ATROPHY: ICD-10-CM

## 2017-06-12 RX ORDER — METHYLPREDNISOLONE 4 MG
TABLET, DOSE PACK ORAL
Qty: 21 TABLET | Refills: 0 | Status: SHIPPED | OUTPATIENT
Start: 2017-06-12

## 2017-06-12 ASSESSMENT — PAIN SCALES - GENERAL: PAINLEVEL: NO PAIN (0)

## 2017-06-12 NOTE — LETTER
"6/12/2017       RE: Jessica Marina  [address] [facility] [address]     Dear Colleague,    Thank you for referring your patient, Jessica Marina, to the [facility] DERMATOLOGY at [facility]. Please see a copy of my visit note below.    [facility] Dermatology Note      Dermatology Problem List:  1.Alopecia areata:   -current treatment: alternating clobetasol 0.05% shampoo, ketoconazole 2% shampoo and DHS Zinc shampoo  -Oral prednisone burst 5/2017  -last ILK injection 5/3/17  -Derma-smoothe oil after hair dye    Encounter Date: Jun 12, 2017    CC:  Chief Complaint   Patient presents with     Derm Problem     Jessica comes to clinic today for alopecia aerata. States \"it's worse.\"         History of Present Illness:  Ms. Jessica Marina is a 57 year old female who is presenting to clinic for alopecia areata follow up. She was last seen on 1/30/17, about 4 months ago. At that time she received ILK injections. She subsequently received ILK injections performed by Carly in 3/2017 and 5/2017. She is currently alternating clobetasol 0.05% shampoo, ketoconazole 2% shampoo and DHS zinc shampoo daily. Since her last visit she reports overall worsening in hair loss and scalp pruritis. No scalp pain. No change in eyebrow or eyelash growth. Continues to take vitamin D. Last labs obtained in 12/2015 revealed low vitamin D. Ferritin, iron, thyroid peroxidase, testosterone, and TSH were all within normal limits    Past Medical History:   Patient Active Problem List   Diagnosis     AA (alopecia areata)     Papules     Dermatitis, seborrheic     Autoimmune disease (H)     Pruritus     Dermatitis     History reviewed. No pertinent past medical history.  Past Surgical History:   Procedure Laterality Date     NO HISTORY OF SURGERY  12/16/13    derm       Social History:  The patient works at [employer]. Originally from [place]    Family History:  There is no family history of " skin cancer.    Medications:  Current Outpatient Prescriptions   Medication Sig Dispense Refill     triamcinolone (KENALOG) 0.1 % ointment Apply topically 2 times daily 15 g 1     ketoconazole (NIZORAL) 2 % shampoo Lather into wet hair three times weekly.  Let sit 3-5 minutes before rinsing. 360 mL 4     VITAMIN D, CHOLECALCIFEROL, PO Take by mouth daily       clobetasol propionate (OLUX) 0.05 % FOAM Externally apply topically daily 100 g 5     clobetasol propionate (CLOBEX) 0.05 % SHAM Apply to dry scalp for 15 minutes, then rinse out.  Use once every 48 hours. 3 Bottle 2     Fluocinolone Acetonide (DERMA-SMOOTHE/FS SCALP) 0.01 % OIL Apply once weekly to scalp as needed for itching 1 Bottle 3     Loratadine (CLARITIN PO) Take  by mouth as needed.       fish oil-omega-3 fatty acids (FISH OIL) 1000 MG capsule Take 2 g by mouth daily.       Magnesium 300 MG CAPS Take  by mouth.       Allergies   Allergen Reactions     Trifluridine      PN: LW Reaction: CAUSES HERPES SYMPTOMS         Review of Systems:  -Skin Establ Pt: The patient denies any new rash, pruritus, or lesions that are symptomatic, changing or bleeding, except as per HPI.  -Constitutional: The patient denies fatigue, fevers, chills, unintended weight loss, and night sweats.  -HEENT: Patient denies nonhealing oral sores.  -Skin: As above in HPI. No additional skin concerns.    Physical exam:  GEN: This is a well developed, well-nourished female in no acute distress, in a pleasant mood.    SKIN: Focused examination of the scalp, neck, arms  and face was performed.  --Decreased hair density and thinning predominantly on frontal scalp hairline and bilateral temporoparietal scalp  -Mild follicular accentuation present primarily on the bilateral temporoparietal scalp   -Regrowth 2-3 cm  -Hair pull test negative  -No hair loss on eyebrows or eyelashes  - On the scalp there are localized areas of atrophy, predominantly on the frontal scalp and bilateral  temporoparietal scalp  -No other lesions of concern on areas examined.     Impression/Plan:  Alopecia areata: Evidence of regrowth but continued thinning through out the bilateral temporoparietal compared to last visit.  Areas of atrophy on the scalp, predominantly around ILK injection sites. Due to evidence of atrophy, favored short course of oral prednisone over IL kenalog injection. After side effects of oral steroids were discussed, patient was receptive to trying this option. Will also hold clobetasol shampoo for now due to scalp atrophy     Continue alternating shampoo ketoconazole 2% shampoo, and DHS-zinc shampoo daily.      Stop using clobetasol 0.05% shampoo due to scalp atrophy    Continue to use derma-smoothe oil on days when  hair is colored      Oral prednisone for one week 4 mg tablets (5 tablets Day 1 and 2, 4 tablets Day 3, 3 tablets Day 4, 2 tablets Day 5 and 1 tablet Day 1)    Ok to resume topical therapy after this      Follow up in 5 weeks     Staff Involved:  Scribed by Brooke Ayala, MS4 for Dr. Any Boyd        I agree with the PFSH and ROS as completed by the Medical Student. The remainder of the encounter was performed by me and scribed by the Medical Student. The scribed note accurately reflects my personal services and the medical decisions made by me.      Any Boyd MD  Professor and Chair  Department of Dermatology  [facility]              Pictures taken of patient today to be placed in chart for future reference.        Again, thank you for allowing me to participate in the care of your patient.      Sincerely,    nAy Boyd MD

## 2017-06-12 NOTE — NURSING NOTE
"Dermatology Rooming Note    Jessica Marina's goals for this visit include:   Chief Complaint   Patient presents with     Derm Problem     Jessica comes to clinic today for alopecia aerata. States \"it's worse.\"     Skye Perez, [facility]    "

## 2017-06-12 NOTE — MR AVS SNAPSHOT
After Visit Summary   6/12/2017    Jessica Marina    MRN: [id number]           Patient Information     Date Of Birth          1960        Visit Information        Provider Department      6/12/2017 7:30 AM Any Boyd MD [facility] Dermatology        Care Instructions    Dr. Boyd's Clinic Follow-up:    If we are unable to schedule your appointment today, then you will receive a personal call from our clinic staff to schedule with Dr. Boyd prior to your expected return.    Please, contact us with any questions via telephone.  We are not using [employer] to schedule appointments for this clinic at this the present time    How do I call with questions?       [facility]: [phone]       For urgent needs outside of business hours call the [facility] at [phone]        and ask for the dermatology resident on call              Follow-ups after your visit        Who to contact     Please call your clinic at [phone] to:    Ask questions about your health    Make or cancel appointments    Discuss your medicines    Learn about your test results    Speak to your doctor   If you have compliments or concerns about an experience at your clinic, or if you wish to file a complaint, please contact [facility] Patient Relations at [phone] or email us at [email or web address]         Additional Information About Your Visit        [employer] Information     [employer] is an electronic gateway that provides easy, online access to your medical records. With [employer] [facility], you can request a clinic appointment, read your test results, renew a prescription or communicate with your care team.     To sign up for [employer] visit the website at [email or web address]   You will be asked to enter the access code listed below, as well as some personal information. Please follow the directions to create your username and  password.     Your access code is: [email or web address]  Expires: 2017  6:31 AM     Your access code will  in 90 days. If you need help or a new code, please contact your [facility] or call [phone] for assistance.        Care EveryWhere ID     This is your Care EveryWhere ID. This could be used by other organizations to access your [place] medical records  [id number]         Blood Pressure from Last 3 Encounters:   17 107/65   10/10/16 98/59   16 97/66    Weight from Last 3 Encounters:   16 54.7 kg (120 lb 9.6 oz)   12/07/15 53.5 kg (118 lb)   09/03/15 53 kg (116 lb 12.8 oz)              Today, you had the following     No orders found for display       Primary Care Provider    None Specified       No primary provider on file.        Thank you!     Thank you for choosing [facility] DERMATOLOGY  for your care. Our goal is always to provide you with excellent care. Hearing back from our patients is one way we can continue to improve our services. Please take a few minutes to complete the written survey that you may receive in the mail after your visit with us. Thank you!             Your Updated Medication List - Protect others around you: Learn how to safely use, store and throw away your medicines at www.disposemymeds.org.          This list is accurate as of: 17  8:16 AM.  Always use your most recent med list.                   Brand Name Dispense Instructions for use    CLARITIN PO      Take  by mouth as needed.       * clobetasol propionate 0.05 % Foam    OLUX    100 g    Externally apply topically daily       * clobetasol propionate 0.05 % Sham    CLOBEX    3 Bottle    Apply to dry scalp for 15 minutes, then rinse out.  Use once every 48 hours.       DERMA-SMOOTHE/FS SCALP 0.01 % Oil     1 Bottle    Apply once weekly to scalp as needed for itching       fish oil-omega-3 fatty acids 1000 MG capsule      Take 2 g by mouth daily.       ketoconazole 2 %  shampoo    NIZORAL    360 mL    Lather into wet hair three times weekly.  Let sit 3-5 minutes before rinsing.       Magnesium 300 MG Caps      Take  by mouth.       triamcinolone 0.1 % ointment    KENALOG    15 g    Apply topically 2 times daily       VITAMIN D (CHOLECALCIFEROL) PO      Take by mouth daily       * Notice:  This list has 2 medication(s) that are the same as other medications prescribed for you. Read the directions carefully, and ask your doctor or other care provider to review them with you.

## 2017-06-12 NOTE — PATIENT INSTRUCTIONS
Dr. Boyd's Clinic Follow-up:    If we are unable to schedule your appointment today, then you will receive a personal call from our clinic staff to schedule with Dr. Boyd prior to your expected return.    Please, contact us with any questions via telephone.  We are not using [employer] to schedule appointments for this clinic at this the present time    How do I call with questions?       [facility]: [phone]       For urgent needs outside of business hours call the [facility] at [phone]        and ask for the dermatology resident on call

## 2017-06-12 NOTE — PROGRESS NOTES
"[facility] Dermatology Note      Dermatology Problem List:  1.Alopecia areata:   -current treatment: alternating clobetasol 0.05% shampoo, ketoconazole 2% shampoo and DHS Zinc shampoo  -Oral prednisone burst 5/2017  -last ILK injection 5/3/17  -Derma-smoothe oil after hair dye    Encounter Date: Jun 12, 2017    CC:  Chief Complaint   Patient presents with     Derm Problem     Jessica comes to clinic today for alopecia aerata. States \"it's worse.\"         History of Present Illness:  Ms. Jessica Marina is a 57 year old female who is presenting to clinic for alopecia areata follow up. She was last seen on 1/30/17, about 4 months ago. At that time she received ILK injections. She subsequently received ILK injections performed by PA's in 3/2017 and 5/2017. She is currently alternating clobetasol 0.05% shampoo, ketoconazole 2% shampoo and DHS zinc shampoo daily. Since her last visit she reports overall worsening in hair loss and scalp pruritis. No scalp pain. No change in eyebrow or eyelash growth. Continues to take vitamin D. Last labs obtained in 12/2015 revealed low vitamin D. Ferritin, iron, thyroid peroxidase, testosterone, and TSH were all within normal limits    Past Medical History:   Patient Active Problem List   Diagnosis     AA (alopecia areata)     Papules     Dermatitis, seborrheic     Autoimmune disease (H)     Pruritus     Dermatitis     History reviewed. No pertinent past medical history.  Past Surgical History:   Procedure Laterality Date     NO HISTORY OF SURGERY  12/16/13    derm       Social History:  The patient works at [employer]. Originally from [place]    Family History:  There is no family history of skin cancer.    Medications:  Current Outpatient Prescriptions   Medication Sig Dispense Refill     triamcinolone (KENALOG) 0.1 % ointment Apply topically 2 times daily 15 g 1     ketoconazole (NIZORAL) 2 % shampoo Lather into wet hair three times weekly.  Let sit 3-5 minutes " before rinsing. 360 mL 4     VITAMIN D, CHOLECALCIFEROL, PO Take by mouth daily       clobetasol propionate (OLUX) 0.05 % FOAM Externally apply topically daily 100 g 5     clobetasol propionate (CLOBEX) 0.05 % SHAM Apply to dry scalp for 15 minutes, then rinse out.  Use once every 48 hours. 3 Bottle 2     Fluocinolone Acetonide (DERMA-SMOOTHE/FS SCALP) 0.01 % OIL Apply once weekly to scalp as needed for itching 1 Bottle 3     Loratadine (CLARITIN PO) Take  by mouth as needed.       fish oil-omega-3 fatty acids (FISH OIL) 1000 MG capsule Take 2 g by mouth daily.       Magnesium 300 MG CAPS Take  by mouth.       Allergies   Allergen Reactions     Trifluridine      PN: LW Reaction: CAUSES HERPES SYMPTOMS         Review of Systems:  -Skin Establ Pt: The patient denies any new rash, pruritus, or lesions that are symptomatic, changing or bleeding, except as per HPI.  -Constitutional: The patient denies fatigue, fevers, chills, unintended weight loss, and night sweats.  -HEENT: Patient denies nonhealing oral sores.  -Skin: As above in HPI. No additional skin concerns.    Physical exam:  GEN: This is a well developed, well-nourished female in no acute distress, in a pleasant mood.    SKIN: Focused examination of the scalp, neck, arms  and face was performed.  --Decreased hair density and thinning predominantly on frontal scalp hairline and bilateral temporoparietal scalp  -Mild follicular accentuation present primarily on the bilateral temporoparietal scalp   -Regrowth 2-3 cm  -Hair pull test negative  -No hair loss on eyebrows or eyelashes  - On the scalp there are localized areas of atrophy, predominantly on the frontal scalp and bilateral temporoparietal scalp  -No other lesions of concern on areas examined.     Impression/Plan:  Alopecia areata: Evidence of regrowth but continued thinning through out the bilateral temporoparietal compared to last visit. Areas of atrophy on the scalp, predominantly around ILK injection  sites. Due to evidence of atrophy, favored short course of oral prednisone over IL kenalog injection. After side effects of oral steroids were discussed, patient was receptive to trying this option. Will also hold clobetasol shampoo for now due to scalp atrophy     Continue alternating shampoo ketoconazole 2% shampoo, and DHS-zinc shampoo daily.      Stop using clobetasol 0.05% shampoo due to scalp atrophy    Continue to use derma-smoothe oil on days when  hair is colored      Oral prednisone for one week 4 mg tablets (5 tablets Day 1 and 2, 4 tablets Day 3, 3 tablets Day 4, 2 tablets Day 5 and 1 tablet Day 1)    Ok to resume topical therapy after this      Follow up in 5 weeks     Staff Involved:  Scribed by Brooke Ayala, MS4 for Dr. Any Boyd        I agree with the PFSH and ROS as completed by the Medical Student. The remainder of the encounter was performed by me and scribed by the Medical Student. The scribed note accurately reflects my personal services and the medical decisions made by me.      Any Boyd MD  Professor and Chair  Department of Dermatology  [facility]

## 2017-07-09 PROBLEM — L90.9 SKIN ATROPHY: Status: ACTIVE | Noted: 2017-07-09

## 2017-12-04 ENCOUNTER — OFFICE VISIT (OUTPATIENT)
Dept: DERMATOLOGY | Facility: CLINIC | Age: 57
End: 2017-12-04

## 2017-12-04 VITALS — SYSTOLIC BLOOD PRESSURE: 109 MMHG | DIASTOLIC BLOOD PRESSURE: 69 MMHG | HEART RATE: 61 BPM

## 2017-12-04 DIAGNOSIS — L63.9 AA (ALOPECIA AREATA): ICD-10-CM

## 2017-12-04 DIAGNOSIS — L63.9 ALOPECIA AREATA: ICD-10-CM

## 2017-12-04 DIAGNOSIS — L63.9 AA (ALOPECIA AREATA): Primary | ICD-10-CM

## 2017-12-04 DIAGNOSIS — L21.9 DERMATITIS, SEBORRHEIC: ICD-10-CM

## 2017-12-04 LAB
BASOPHILS # BLD AUTO: 0 10E9/L (ref 0–0.2)
BASOPHILS NFR BLD AUTO: 0.6 %
DEPRECATED CALCIDIOL+CALCIFEROL SERPL-MC: 57 UG/L (ref 20–75)
DIFFERENTIAL METHOD BLD: ABNORMAL
EOSINOPHIL # BLD AUTO: 0.1 10E9/L (ref 0–0.7)
EOSINOPHIL NFR BLD AUTO: 2.1 %
ERYTHROCYTE [DISTWIDTH] IN BLOOD BY AUTOMATED COUNT: 12 % (ref 10–15)
FERRITIN SERPL-MCNC: 89 NG/ML (ref 8–252)
HCT VFR BLD AUTO: 42.9 % (ref 35–47)
HGB BLD-MCNC: 14.3 G/DL (ref 11.7–15.7)
IMM GRANULOCYTES # BLD: 0 10E9/L (ref 0–0.4)
IMM GRANULOCYTES NFR BLD: 0.6 %
IRON SATN MFR SERPL: 45 % (ref 15–46)
IRON SERPL-MCNC: 135 UG/DL (ref 35–180)
LYMPHOCYTES # BLD AUTO: 0.7 10E9/L (ref 0.8–5.3)
LYMPHOCYTES NFR BLD AUTO: 21.1 %
MCH RBC QN AUTO: 33.3 PG (ref 26.5–33)
MCHC RBC AUTO-ENTMCNC: 33.3 G/DL (ref 31.5–36.5)
MCV RBC AUTO: 100 FL (ref 78–100)
MONOCYTES # BLD AUTO: 0.4 10E9/L (ref 0–1.3)
MONOCYTES NFR BLD AUTO: 11.4 %
NEUTROPHILS # BLD AUTO: 2.1 10E9/L (ref 1.6–8.3)
NEUTROPHILS NFR BLD AUTO: 64.2 %
NRBC # BLD AUTO: 0 10*3/UL
NRBC BLD AUTO-RTO: 0 /100
PLATELET # BLD AUTO: 197 10E9/L (ref 150–450)
RBC # BLD AUTO: 4.29 10E12/L (ref 3.8–5.2)
TIBC SERPL-MCNC: 300 UG/DL (ref 240–430)
TSH SERPL DL<=0.005 MIU/L-ACNC: 1.18 MU/L (ref 0.4–4)
WBC # BLD AUTO: 3.3 10E9/L (ref 4–11)

## 2017-12-04 ASSESSMENT — PAIN SCALES - GENERAL: PAINLEVEL: NO PAIN (0)

## 2017-12-04 NOTE — PROGRESS NOTES
"[facility] Dermatology Note      Dermatology Problem List:  1.Alopecia areata:   -Current tx: alternating clobetasol 0.05% shampoo, ketoconazole 2% shampoo and DHS Zinc shampoo, Derma-smoothe FS oil  -Previous Tx: Oral prednisone burst 5/2017, last ILK injection 5/3/17    2. Seborrheic dermatitis, shampoo treatment as in #1      Encounter Date: Dec 4, 2017    CC:  Chief Complaint   Patient presents with     Hair Loss     Jessica is here today for a hair loss follow up. Jessica notes\" Im not doing to well, I am still have clumps on hair falling out\"     History of Present Illness:  Ms. Jessica Marina is a 57 year old female who is presenting to clinic for alopecia areata follow up. She was last seen on 6/12/17 when the pt was continued on shampoo ketoconazole 2% shampoo, DHS zinc shampoo, derma-smoothe FS oil, and oral prednisone. The pt reports some improvement in hair and has no problems with hair in other hair bearing regions. She is using T/gel and clobetasol and ketoconazole on alternating days.     The pt still has eyebrows and has no other changes in hair on her body.     Past Medical History:   Patient Active Problem List   Diagnosis     AA (alopecia areata)     Papules     Dermatitis, seborrheic     Autoimmune disease (H)     Pruritus     Dermatitis     Skin atrophy     History reviewed. No pertinent past medical history.  Past Surgical History:   Procedure Laterality Date     NO HISTORY OF SURGERY  12/16/13    derm       Social History:  The patient works at [employer]. Originally from [place]    Family History:  There is no family history of skin cancer.    Medications:  Current Outpatient Prescriptions   Medication Sig Dispense Refill     methylPREDNISolone (MEDROL DOSEPAK) 4 MG tablet Follow package instructions 21 tablet 0     triamcinolone (KENALOG) 0.1 % ointment Apply topically 2 times daily 15 g 1     ketoconazole (NIZORAL) 2 % shampoo Lather into wet hair three times weekly.  Let " sit 3-5 minutes before rinsing. 360 mL 4     VITAMIN D, CHOLECALCIFEROL, PO Take by mouth daily       clobetasol propionate (OLUX) 0.05 % FOAM Externally apply topically daily 100 g 5     clobetasol propionate (CLOBEX) 0.05 % SHAM Apply to dry scalp for 15 minutes, then rinse out.  Use once every 48 hours. 3 Bottle 2     Fluocinolone Acetonide (DERMA-SMOOTHE/FS SCALP) 0.01 % OIL Apply once weekly to scalp as needed for itching 1 Bottle 3     Loratadine (CLARITIN PO) Take  by mouth as needed.       fish oil-omega-3 fatty acids (FISH OIL) 1000 MG capsule Take 2 g by mouth daily.       Magnesium 300 MG CAPS Take  by mouth.       Allergies   Allergen Reactions     Trifluridine      PN: LW Reaction: CAUSES HERPES SYMPTOMS         Review of Systems:  -Constitutional: The patient denies fatigue, fevers, chills, unintended weight loss, and night sweats.  -Skin: As above in HPI. No additional skin concerns.    Physical exam:  GEN: This is a well developed, well-nourished female in no acute distress, in a pleasant mood.    SKIN: Focused examination of the scalp, neck was performed.  -Mild scale and pink color on left parietal scalp  -Hair pull test negative  -Follicular plugging on the bilateral parietal scalp   - No discrete patches of alopecia areata, thinning/decrease in scalp hair density appreciated  -No other lesions of concern on areas examined.     Impression/Plan:  1. Alopecia areata and mild scalp dermatitis: Evidence of follicular plugging through out the bilateral parietal similar to previous visit. Pt advised to increase use of clobetasol shampoo on the lateral sides of the scalp as these areas are not as well controlled as the central and frontal scalp.     Continue alternating shampoo ketoconazole 2% shampoo with clobetasol shampoo    Stop T gel shampoo.     Photodocumentation    Labs today to rule out any associated medical issue commonly associated with hair loss/thinning    Follow up in 4 months, earlier for  new or changing lesions.     Staff Involved:  Scribe/Staff     Scribe Disclosure:   I, Sussy Summers, am serving as a scribe to document services personally performed by Dr. Any Boyd, based on data collection and the provider's statements to me.     I agree with the PFSH and ROS as completed by the scribe. The scribed note accurately reflects my personal services and the medical decisions made by me.    Any Boyd MD  Professor and Chair  Department of Dermatology  [facility]

## 2017-12-04 NOTE — MR AVS SNAPSHOT
After Visit Summary   12/4/2017    Jessica Marina    MRN: [id number]           Patient Information     Date Of Birth          1960        Visit Information        Provider Department      12/4/2017 8:45 AM Any Boyd MD [facility] Dermatology        Today's Diagnoses     AA (alopecia areata)    -  1       Follow-ups after your visit        Your next 10 appointments already scheduled     Dec 04, 2017 10:15 AM CST   LAB with  LAB   [facility])    [address] [place] [id number] [phone]           Please do not eat 10-12 hours before your appointment if you are coming in fasting for labs on lipids, cholesterol, or glucose (sugar). This does not apply to pregnant women. Water, hot tea and black coffee (with nothing added) are okay. Do not drink other fluids, diet soda or chew gum.            Mar 05, 2018  7:30 AM CST   (Arrive by 7:15 AM)   RETURN HAIRLOSS with Any Boyd MD   [facility] Dermatology ([facility])    [address] [place] [id number] [phone]              Future tests that were ordered for you today     Open Future Orders        Priority Expected Expires Ordered    CBC with platelets differential Routine  12/4/2018 12/4/2017    Iron and iron binding capacity Routine  12/4/2018 12/4/2017    Ferritin Routine  12/4/2018 12/4/2017    Vitamin D Deficiency Routine  12/4/2018 12/4/2017    TSH with free T4 reflex Routine  12/4/2018 12/4/2017            Who to contact     Please call your clinic at [phone] to:    Ask questions about your health    Make or cancel appointments    Discuss your medicines    Learn about your test results    Speak to your doctor   If you have compliments or concerns about an experience at your clinic, or if you wish to file a complaint, please contact [facility] Patient Relations  at [phone] or email us at [email or web address]         Additional Information About Your Visit        [employer] Information     [employer] is an electronic gateway that provides easy, online access to your medical records. With [employer], you can request a clinic appointment, read your test results, renew a prescription or communicate with your care team.     To sign up for [employer] visit the website at [email or web address]   You will be asked to enter the access code listed below, as well as some personal information. Please follow the directions to create your username and password.     Your access code is: [email or web address]  Expires: 3/4/2018  9:57 AM     Your access code will  in 90 days. If you need help or a new code, please contact your [facility] or call [phone] for assistance.        Care EveryWhere ID     This is your Care EveryWhere ID. This could be used by other organizations to access your [place] medical records  [id number]        Your Vitals Were     Pulse                   61            Blood Pressure from Last 3 Encounters:   17 109/69   17 107/65   10/10/16 98/59    Weight from Last 3 Encounters:   16 54.7 kg (120 lb 9.6 oz)   12/07/15 53.5 kg (118 lb)   09/03/15 53 kg (116 lb 12.8 oz)               Primary Care Provider    None Specified       No primary provider on file.        Equal Access to Services     CHAD TIRADO : Hadii jesi isaaco Sosierra, waaxda luqadaha, qaybta kaalmada adedestinyda, john schwarz. So [facility] [phone].    ATENCIÓN: Si habla español, tiene a larry disposición servicios gratuitos de asistencia lingüística. Llame al [phone].    We comply with applicable federal civil rights laws and [place] laws. We do not discriminate on the basis of race, color, national origin, age, disability, sex, sexual orientation, or gender identity.            Thank you!     Thank you for  choosing [facility] DERMATOLOGY  for your care. Our goal is always to provide you with excellent care. Hearing back from our patients is one way we can continue to improve our services. Please take a few minutes to complete the written survey that you may receive in the mail after your visit with us. Thank you!             Your Updated Medication List - Protect others around you: Learn how to safely use, store and throw away your medicines at www.disposemymeds.org.          This list is accurate as of: 12/4/17  9:57 AM.  Always use your most recent med list.                   Brand Name Dispense Instructions for use Diagnosis    CLARITIN PO      Take  by mouth as needed.        * clobetasol propionate 0.05 % Foam    OLUX    100 g    Externally apply topically daily    AA (alopecia areata)       * clobetasol propionate 0.05 % Sham    CLOBEX    3 Bottle    Apply to dry scalp for 15 minutes, then rinse out.  Use once every 48 hours.    Dermatitis       DERMA-SMOOTHE/FS SCALP 0.01 % Oil oil   Generic drug:  Fluocinolone Acetonide Scalp     1 Bottle    Apply once weekly to scalp as needed for itching    Seborrhoeic dermatitis       fish oil-omega-3 fatty acids 1000 MG capsule      Take 2 g by mouth daily.        ketoconazole 2 % shampoo    NIZORAL    360 mL    Lather into wet hair three times weekly.  Let sit 3-5 minutes before rinsing.    Dermatitis       Magnesium 300 MG Caps      Take  by mouth.        methylPREDNISolone 4 MG tablet    MEDROL DOSEPAK    21 tablet    Follow package instructions    Alopecia areata       triamcinolone 0.1 % ointment    KENALOG    15 g    Apply topically 2 times daily    Dermatitis       VITAMIN D (CHOLECALCIFEROL) PO      Take by mouth daily        * Notice:  This list has 2 medication(s) that are the same as other medications prescribed for you. Read the directions carefully, and ask your doctor or other care provider to review them with you.

## 2017-12-04 NOTE — LETTER
"12/4/2017       RE: Jessica Marina  [address] [address]     Dear Colleague,    Thank you for referring your patient, Jessica Marina, to the [facility] DERMATOLOGY at [facility]. Please see a copy of my visit note below.    [facility] Dermatology Note      Dermatology Problem List:  1.Alopecia areata:   -Current tx: alternating clobetasol 0.05% shampoo, ketoconazole 2% shampoo and DHS Zinc shampoo, Derma-smoothe FS oil  -Previous Tx: Oral prednisone burst 5/2017, last ILK injection 5/3/17    2. Seborrheic dermatitis, shampoo treatment as in #1      Encounter Date: Dec 4, 2017    CC:  Chief Complaint   Patient presents with     Hair Loss     Jessica is here today for a hair loss follow up. Jessica notes\" Im not doing to well, I am still have clumps on hair falling out\"     History of Present Illness:  Ms. Jessica Marina is a 57 year old female who is presenting to clinic for alopecia areata follow up. She was last seen on 6/12/17 when the pt was continued on shampoo ketoconazole 2% shampoo, DHS zinc shampoo, derma-smoothe FS oil, and oral prednisone. The pt reports some improvement in hair and has no problems with hair in other hair bearing regions. She is using T/gel and clobetasol and ketoconazole on alternating days.     The pt still has eyebrows and has no other changes in hair on her body.     Past Medical History:   Patient Active Problem List   Diagnosis     AA (alopecia areata)     Papules     Dermatitis, seborrheic     Autoimmune disease (H)     Pruritus     Dermatitis     Skin atrophy     History reviewed. No pertinent past medical history.  Past Surgical History:   Procedure Laterality Date     NO HISTORY OF SURGERY  12/16/13    derm       Social History:  The patient works at [employer]. Originally from [place]    Family History:  There is no family history of skin cancer.    Medications:  Current Outpatient Prescriptions "   Medication Sig Dispense Refill     methylPREDNISolone (MEDROL DOSEPAK) 4 MG tablet Follow package instructions 21 tablet 0     triamcinolone (KENALOG) 0.1 % ointment Apply topically 2 times daily 15 g 1     ketoconazole (NIZORAL) 2 % shampoo Lather into wet hair three times weekly.  Let sit 3-5 minutes before rinsing. 360 mL 4     VITAMIN D, CHOLECALCIFEROL, PO Take by mouth daily       clobetasol propionate (OLUX) 0.05 % FOAM Externally apply topically daily 100 g 5     clobetasol propionate (CLOBEX) 0.05 % SHAM Apply to dry scalp for 15 minutes, then rinse out.  Use once every 48 hours. 3 Bottle 2     Fluocinolone Acetonide (DERMA-SMOOTHE/FS SCALP) 0.01 % OIL Apply once weekly to scalp as needed for itching 1 Bottle 3     Loratadine (CLARITIN PO) Take  by mouth as needed.       fish oil-omega-3 fatty acids (FISH OIL) 1000 MG capsule Take 2 g by mouth daily.       Magnesium 300 MG CAPS Take  by mouth.       Allergies   Allergen Reactions     Trifluridine      PN: LW Reaction: CAUSES HERPES SYMPTOMS         Review of Systems:  -Constitutional: The patient denies fatigue, fevers, chills, unintended weight loss, and night sweats.  -Skin: As above in HPI. No additional skin concerns.    Physical exam:  GEN: This is a well developed, well-nourished female in no acute distress, in a pleasant mood.    SKIN: Focused examination of the scalp, neck was performed.  -Mild scale and pink color on left parietal scalp  -Hair pull test negative  -Follicular plugging on the bilateral parietal scalp   - No discrete patches of alopecia areata, thinning/decrease in scalp hair density appreciated  -No other lesions of concern on areas examined.     Impression/Plan:  1. Alopecia areata and mild scalp dermatitis: Evidence of follicular plugging through out the bilateral parietal similar to previous visit. Pt advised to increase use of clobetasol shampoo on the lateral sides of the scalp as these areas are not as well controlled as the  central and frontal scalp.     Continue alternating shampoo ketoconazole 2% shampoo with clobetasol shampoo    Stop T gel shampoo.     Photodocumentation    Labs today to rule out any associated medical issue commonly associated with hair loss/thinning    Follow up in 4 months, earlier for new or changing lesions.     Staff Involved:  Scribe/Staff     Scribe Disclosure:   I, Sussy Summers, am serving as a scribe to document services personally performed by Dr. Any Boyd, based on data collection and the provider's statements to me.     I agree with the PFSH and ROS as completed by the scribe. The scribed note accurately reflects my personal services and the medical decisions made by me.    Any Boyd MD  Professor and Chair  Department of Dermatology  [facility]              Pictures were placed in Pt's chart today for future reference.      Again, thank you for allowing me to participate in the care of your patient.      Sincerely,    Any Boyd MD

## 2017-12-04 NOTE — NURSING NOTE
"Dermatology Rooming Note    Jessica Marina's goals for this visit include:   Chief Complaint   Patient presents with     Hair Loss     Jessica is here today for a hair loss follow up. Jessica notes\" Im not doing to well, I am still have clumps on hair falling out\"     Ella Corrigan, RMSAÚL    "

## 2017-12-05 LAB — THYROPEROXIDASE AB SERPL-ACNC: <10 IU/ML

## 2017-12-20 DIAGNOSIS — L30.9 DERMATITIS: ICD-10-CM

## 2017-12-20 RX ORDER — CLOBETASOL PROPIONATE 0.05 G/100ML
SHAMPOO TOPICAL
Qty: 3 BOTTLE | Refills: 2 | Status: SHIPPED | OUTPATIENT
Start: 2017-12-20 | End: 2018-10-18

## 2017-12-20 RX ORDER — KETOCONAZOLE 20 MG/ML
SHAMPOO TOPICAL
Qty: 360 ML | Refills: 4 | Status: SHIPPED | OUTPATIENT
Start: 2017-12-20 | End: 2018-10-18

## 2017-12-20 NOTE — TELEPHONE ENCOUNTER
Refill request received  for ketoconazole 2% shampoo and clobex 0.05% shampoo. Dr. Boyd's notes reviewed. Refill appropriate, and accepted under RN Refill protocol.    Shawna Lackey RN

## 2017-12-23 ENCOUNTER — HEALTH MAINTENANCE LETTER (OUTPATIENT)
Age: 57
End: 2017-12-23

## 2018-03-09 ENCOUNTER — OFFICE VISIT (OUTPATIENT)
Dept: DERMATOLOGY | Facility: CLINIC | Age: 58
End: 2018-03-09
Payer: COMMERCIAL

## 2018-03-09 VITALS — SYSTOLIC BLOOD PRESSURE: 102 MMHG | DIASTOLIC BLOOD PRESSURE: 62 MMHG | HEART RATE: 63 BPM

## 2018-03-09 DIAGNOSIS — L21.9 DERMATITIS, SEBORRHEIC: ICD-10-CM

## 2018-03-09 DIAGNOSIS — L63.9 AA (ALOPECIA AREATA): Primary | ICD-10-CM

## 2018-03-09 NOTE — NURSING NOTE
"Dermatology Rooming Note    Jessica Marina's goals for this visit include:   Chief Complaint   Patient presents with     Hair Loss     Jessica is visiting for hair loss. She says \"there has been more hair loss.\"     Sofie Barba LPN    "

## 2018-03-09 NOTE — MR AVS SNAPSHOT
After Visit Summary   3/9/2018    Jessica Marina    MRN: [id number]           Patient Information     Date Of Birth          1960        Visit Information        Provider Department      3/9/2018 10:40 AM Any Boyd MD [facility] Dermatology        Today's Diagnoses     AA (alopecia areata)    -  1    Dermatitis, seborrheic           Follow-ups after your visit        Who to contact     Please call your clinic at [phone] to:    Ask questions about your health    Make or cancel appointments    Discuss your medicines    Learn about your test results    Speak to your doctor            Additional Information About Your Visit        MyChart Information     [facility] gives you secure access to your electronic health record. If you see a primary care provider, you can also send messages to your care team and make appointments. If you have questions, please call your primary care clinic.  If you do not have a primary care provider, please call [phone] and they will assist you.      [facility] is an electronic gateway that provides easy, online access to your medical records. With [facility], you can request a clinic appointment, read your test results, renew a prescription or communicate with your care team.     To access your existing account, please contact your [facility] or call [phone] for assistance.        Care EveryWhere ID     This is your Care EveryWhere ID. This could be used by other organizations to access your [place] medical records  [id number]        Your Vitals Were     Pulse                   63            Blood Pressure from Last 3 Encounters:   03/09/18 102/62   12/04/17 109/69   01/30/17 107/65    Weight from Last 3 Encounters:   05/31/16 54.7 kg (120 lb 9.6 oz)   12/07/15 53.5 kg (118 lb)   09/03/15 53 kg (116 lb 12.8 oz)              Today, you had the following     No orders found for display       Primary Care Provider     None Specified       No primary provider on file.        Equal Access to Services     [facility] : Hadmarcie jesi rivera davion Jeffreyali, waromanada pinkyhakanha, qashena carterkashifjohn bishopheronzonia schwarz. So [facility] [phone].    ATENCIÓN: Si habla español, tiene a larry disposición servicios gratuitos de asistencia lingüística. Joseame al [phone].    We comply with applicable federal civil rights laws and [place] laws. We do not discriminate on the basis of race, color, national origin, age, disability, sex, sexual orientation, or gender identity.            Thank you!     Thank you for choosing [facility] DERMATOLOGY  for your care. Our goal is always to provide you with excellent care. Hearing back from our patients is one way we can continue to improve our services. Please take a few minutes to complete the written survey that you may receive in the mail after your visit with us. Thank you!             Your Updated Medication List - Protect others around you: Learn how to safely use, store and throw away your medicines at www.disposemymeds.org.          This list is accurate as of 3/9/18 11:59 PM.  Always use your most recent med list.                   Brand Name Dispense Instructions for use Diagnosis    CLARITIN PO      Take  by mouth as needed.        * clobetasol propionate 0.05 % Foam    OLUX    100 g    Externally apply topically daily    AA (alopecia areata)       * clobetasol propionate 0.05 % Sham    CLOBEX    3 Bottle    Apply to dry scalp for 15 minutes, then rinse out.  Use once every 48 hours.    Dermatitis       DERMA-SMOOTHE/FS SCALP 0.01 % Oil oil   Generic drug:  Fluocinolone Acetonide Scalp     1 Bottle    Apply once weekly to scalp as needed for itching    Seborrhoeic dermatitis       fish oil-omega-3 fatty acids 1000 MG capsule      Take 2 g by mouth daily.        ketoconazole 2 % shampoo    NIZORAL    360 mL    Lather into wet hair three times weekly.  Let sit 3-5 minutes  before rinsing.    Dermatitis       Magnesium 300 MG Caps      Take  by mouth.        methylPREDNISolone 4 MG tablet    MEDROL DOSEPAK    21 tablet    Follow package instructions    Alopecia areata       triamcinolone 0.1 % ointment    KENALOG    15 g    Apply topically 2 times daily    Dermatitis       VITAMIN D (CHOLECALCIFEROL) PO      Take by mouth daily        * Notice:  This list has 2 medication(s) that are the same as other medications prescribed for you. Read the directions carefully, and ask your doctor or other care provider to review them with you.

## 2018-03-09 NOTE — PROGRESS NOTES
"[facility] Dermatology Note      Dermatology Problem List:  1.Alopecia areata:   -Current tx: alternating clobetasol 0.05% shampoo, ketoconazole 2% shampoo and DHS Zinc shampoo, Derma-smoothe FS oil  -Previous Tx: Oral prednisone burst 5/2017, last ILK injection 5/3/17     2. Seborrheic dermatitis, shampoo treatment as in #1      Encounter Date: Mar 9, 2018    CC:  Chief Complaint   Patient presents with     Hair Loss     Jessica is visiting for hair loss. She says \"there has been more hair loss.\"         History of Present Illness:  Ms. Jessica Marina is a 58 year old female who presents as a follow-up for alopecia areata. The patient was last seen 12/4 when labs were drawn including thyroid studies, vitamin D, iron studies, and CBC, all WNL apart from slightly low WBC (3.3). She reports that the hair shedding has slowed down and has seemed to stabilize. She denies any changes in eyebrows, lashes, or body hair. Denies scalp itching, burning, pain, or tingling. She is shampooing with ketoconazole shampoo alternating with clobex shampoo QOD, and supplementing with magnesium and fish oil.     She has lost approximately 10lb since late last year but reports that this has been intentional as she has been maintaining a healthier diet. She does have a physical scheduled next week with her PCP, will follow up on this at that time.     Past Medical History:   Patient Active Problem List   Diagnosis     AA (alopecia areata)     Papules     Dermatitis, seborrheic     Autoimmune disease (H)     Pruritus     Dermatitis     Skin atrophy     History reviewed. No pertinent past medical history.  Past Surgical History:   Procedure Laterality Date     NO HISTORY OF SURGERY  12/16/13    derm         Medications:  Current Outpatient Prescriptions   Medication Sig Dispense Refill     clobetasol propionate (CLOBEX) 0.05 % SHAM Apply to dry scalp for 15 minutes, then rinse out.  Use once every 48 hours. 3 Bottle 2 "     ketoconazole (NIZORAL) 2 % shampoo Lather into wet hair three times weekly.  Let sit 3-5 minutes before rinsing. 360 mL 4     methylPREDNISolone (MEDROL DOSEPAK) 4 MG tablet Follow package instructions 21 tablet 0     triamcinolone (KENALOG) 0.1 % ointment Apply topically 2 times daily 15 g 1     VITAMIN D, CHOLECALCIFEROL, PO Take by mouth daily       clobetasol propionate (OLUX) 0.05 % FOAM Externally apply topically daily 100 g 5     Fluocinolone Acetonide (DERMA-SMOOTHE/FS SCALP) 0.01 % OIL Apply once weekly to scalp as needed for itching 1 Bottle 3     Loratadine (CLARITIN PO) Take  by mouth as needed.       fish oil-omega-3 fatty acids (FISH OIL) 1000 MG capsule Take 2 g by mouth daily.       Magnesium 300 MG CAPS Take  by mouth.       Allergies   Allergen Reactions     Trifluridine      PN: LW Reaction: CAUSES HERPES SYMPTOMS         Review of Systems:  -As per HPI  -Constitutional: The patient denies fatigue, fevers, chills, unintended weight loss, and night sweats.  -Skin: As above in HPI. No additional skin concerns.    Physical exam:  Vitals: /62 (BP Location: Left arm, Patient Position: Chair, Cuff Size: Adult Regular)  Pulse 63  GEN: This is a well developed, well-nourished female in no acute distress, in a pleasant mood.    SKIN: Focused examination of the scalp and face was performed.  -Alopecia is improved compared to photographs from last visit, with reduction in anterior part width and increased density of fibers on bilateral parietal scalp   -No discrete patches of alopecia  -Hair pull test negative   -Mild adherent scale and patchy pinkness noted   -No other lesions of concern on areas examined.     Impression/Plan:  1. Alopecia areata - Stable to improved compared to photographs from last visit with improvement in hair density noted throughout scalp. Recommend resuming dermasmoothe FS oil for the remainder of winter to improve scalp health.     Continue clobetasol 0.05% shampoo  alternating with ketoconazole 2% shampoo, QOD    Use dermasmoothe/FS 0.01% oil once a week overnight    Photographs taken for future reference       Follow-up in 6-8 months, earlier for new or changing lesions.     Staff Involved:  Scribed by Ilana Mathew, MS4 for Dr. Boyd.      I agree with the PFSH and ROS as completed by the Medical Student. The remainder of the encounter was performed by me and scribed by the Medical Student. The scribed note accurately reflects my personal services and the medical decisions made by me.    Any Boyd MD  Professor and Chair  Department of Dermatology  [facility]

## 2018-03-09 NOTE — LETTER
"3/9/2018       RE: Jessica Marina  [address] [facility] [address]     Dear Colleague,    Thank you for referring your patient, Jessica Marina, to the [facility] DERMATOLOGY at [facility]. Please see a copy of my visit note below.    [facility] Dermatology Note      Dermatology Problem List:  1.Alopecia areata:   -Current tx: alternating clobetasol 0.05% shampoo, ketoconazole 2% shampoo and DHS Zinc shampoo, Derma-smoothe FS oil  -Previous Tx: Oral prednisone burst 5/2017, last ILK injection 5/3/17     2. Seborrheic dermatitis, shampoo treatment as in #1      Encounter Date: Mar 9, 2018    CC:  Chief Complaint   Patient presents with     Hair Loss     Jessica is visiting for hair loss. She says \"there has been more hair loss.\"         History of Present Illness:  Ms. Jessica Marina is a 58 year old female who presents as a follow-up for alopecia areata. The patient was last seen 12/4 when labs were drawn including thyroid studies, vitamin D, iron studies, and CBC, all WNL apart from slightly low WBC (3.3). She reports that the hair shedding has slowed down and has seemed to stabilize. She denies any changes in eyebrows, lashes, or body hair. Denies scalp itching, burning, pain, or tingling. She is shampooing with ketoconazole shampoo alternating with clobex shampoo QOD, and supplementing with magnesium and fish oil.     She has lost approximately 10lb since late last year but reports that this has been intentional as she has been maintaining a healthier diet. She does have a physical scheduled next week with her PCP, will follow up on this at that time.     Past Medical History:   Patient Active Problem List   Diagnosis     AA (alopecia areata)     Papules     Dermatitis, seborrheic     Autoimmune disease (H)     Pruritus     Dermatitis     Skin atrophy     History reviewed. No pertinent past medical history.  Past Surgical History:   Procedure " Laterality Date     NO HISTORY OF SURGERY  12/16/13    derm         Medications:  Current Outpatient Prescriptions   Medication Sig Dispense Refill     clobetasol propionate (CLOBEX) 0.05 % SHAM Apply to dry scalp for 15 minutes, then rinse out.  Use once every 48 hours. 3 Bottle 2     ketoconazole (NIZORAL) 2 % shampoo Lather into wet hair three times weekly.  Let sit 3-5 minutes before rinsing. 360 mL 4     methylPREDNISolone (MEDROL DOSEPAK) 4 MG tablet Follow package instructions 21 tablet 0     triamcinolone (KENALOG) 0.1 % ointment Apply topically 2 times daily 15 g 1     VITAMIN D, CHOLECALCIFEROL, PO Take by mouth daily       clobetasol propionate (OLUX) 0.05 % FOAM Externally apply topically daily 100 g 5     Fluocinolone Acetonide (DERMA-SMOOTHE/FS SCALP) 0.01 % OIL Apply once weekly to scalp as needed for itching 1 Bottle 3     Loratadine (CLARITIN PO) Take  by mouth as needed.       fish oil-omega-3 fatty acids (FISH OIL) 1000 MG capsule Take 2 g by mouth daily.       Magnesium 300 MG CAPS Take  by mouth.       Allergies   Allergen Reactions     Trifluridine      PN: LW Reaction: CAUSES HERPES SYMPTOMS         Review of Systems:  -As per HPI  -Constitutional: The patient denies fatigue, fevers, chills, unintended weight loss, and night sweats.  -Skin: As above in HPI. No additional skin concerns.    Physical exam:  Vitals: /62 (BP Location: Left arm, Patient Position: Chair, Cuff Size: Adult Regular)  Pulse 63  GEN: This is a well developed, well-nourished female in no acute distress, in a pleasant mood.    SKIN: Focused examination of the scalp and face was performed.  -Alopecia is improved compared to photographs from last visit, with reduction in anterior part width and increased density of fibers on bilateral parietal scalp   -No discrete patches of alopecia  -Hair pull test negative   -Mild adherent scale and patchy pinkness noted   -No other lesions of concern on areas examined.      Impression/Plan:  1. Alopecia areata - Stable to improved compared to photographs from last visit with improvement in hair density noted throughout scalp. Recommend resuming dermasmoothe FS oil for the remainder of winter to improve scalp health.     Continue clobetasol 0.05% shampoo alternating with ketoconazole 2% shampoo, QOD    Use dermasmoothe/FS 0.01% oil once a week overnight    Photographs taken for future reference       Follow-up in 6-8 months, earlier for new or changing lesions.     Staff Involved:  Scribed by Ilana Mathew, MS4 for Dr. Boyd.      I agree with the PFSH and ROS as completed by the Medical Student. The remainder of the encounter was performed by me and scribed by the Medical Student. The scribed note accurately reflects my personal services and the medical decisions made by me.    Any Boyd MD  Professor and Chair  Department of Dermatology  [facility]                      Again, thank you for allowing me to participate in the care of your patient.      Sincerely,    Any Boyd MD

## 2018-07-05 LAB — MAMMOGRAM: NORMAL

## 2018-10-18 ENCOUNTER — OFFICE VISIT (OUTPATIENT)
Dept: DERMATOLOGY | Facility: CLINIC | Age: 58
End: 2018-10-18
Payer: COMMERCIAL

## 2018-10-18 ENCOUNTER — TELEPHONE (OUTPATIENT)
Dept: DERMATOLOGY | Facility: CLINIC | Age: 58
End: 2018-10-18

## 2018-10-18 VITALS — DIASTOLIC BLOOD PRESSURE: 65 MMHG | RESPIRATION RATE: 18 BRPM | SYSTOLIC BLOOD PRESSURE: 97 MMHG | HEART RATE: 68 BPM

## 2018-10-18 DIAGNOSIS — L63.9 AA (ALOPECIA AREATA): ICD-10-CM

## 2018-10-18 DIAGNOSIS — L21.9 DERMATITIS, SEBORRHEIC: Primary | ICD-10-CM

## 2018-10-18 DIAGNOSIS — L30.9 DERMATITIS: ICD-10-CM

## 2018-10-18 RX ORDER — KETOCONAZOLE 20 MG/ML
SHAMPOO TOPICAL
Qty: 360 ML | Refills: 4 | Status: SHIPPED | OUTPATIENT
Start: 2018-10-18 | End: 2019-11-22

## 2018-10-18 RX ORDER — FLUOCINOLONE ACETONIDE 0.11 MG/ML
OIL TOPICAL
Qty: 1 BOTTLE | Refills: 1 | Status: SHIPPED | OUTPATIENT
Start: 2018-10-18

## 2018-10-18 RX ORDER — CLOBETASOL PROPIONATE 0.05 G/100ML
SHAMPOO TOPICAL
Qty: 3 BOTTLE | Refills: 2 | Status: SHIPPED | OUTPATIENT
Start: 2018-10-18 | End: 2019-11-22

## 2018-10-18 ASSESSMENT — PAIN SCALES - GENERAL: PAINLEVEL: NO PAIN (0)

## 2018-10-18 NOTE — TELEPHONE ENCOUNTER
Prior Authorization Retail Medication Request    Medication/Dose: clobetasol propionate 0.05% shampoo  ICD code (if different than what is on RX):  L30.9  Previously Tried and Failed:  Unknown  Rationale:  Patient has used this shampoo before and it has worked well for her.     Insurance Name:  SEA/ SEA OPEN ACCESS   Insurance ID: [id number]         Pharmacy Information (if different than what is on RX)  Name:    Phone:

## 2018-10-18 NOTE — LETTER
10/18/2018       RE: Jessica Marina  [address] [facility] [address]     Dear Colleague,    Thank you for referring your patient, Jessica Marina, to the [facility] DERMATOLOGY at [facility]. Please see a copy of my visit note below.    [facility] Dermatology Note      Dermatology Problem List:  1. Alopecia areata  - diffuse thinning but good hair regrowth overall    Continue Clobex shampoo every other day, apply to dry scalp for 15 minutes, then rinse    Continue derma-Smoothe/FS oil then follow with ketoconazole the next morning  2. Seborrheic dermatitis    Continue shampooing every other day with ketoconazole 2%    Encounter Date: Oct 18, 2018    CC:  Chief Complaint   Patient presents with     Hair Loss     Jessica is here today for a hair loss follow up- notes some frontal loss.          History of Present Illness:  Ms. Jessica Marina is a 58 year old female who presents as a follow-up for alopecia areata. The patient was last seen 3/2018. She has noticed more thinning on the left parietal and right vertex scalp and continued thinning along the fronalt hairline. Currently she uses 2% ketoconazole and clobetasol shampoos alternating every 3 days, women's Rogaine foam every day, hair dye every 8 weeks, and derma-Smoothe oil/FS once weekly      Past Medical History:   Patient Active Problem List   Diagnosis     AA (alopecia areata)     Papules     Dermatitis, seborrheic     Autoimmune disease (H)     Pruritus     Dermatitis     Skin atrophy     History reviewed. No pertinent past medical history.  Past Surgical History:   Procedure Laterality Date     NO HISTORY OF SURGERY  12/16/13    derm       Social History:   reports that she has never smoked. She has never used smokeless tobacco.    Family History:  Family History   Problem Relation Age of Onset     Cancer No family hx of      no skin cancer     Melanoma No family hx of      Skin Cancer No  family hx of        Medications:  Current Outpatient Prescriptions   Medication Sig Dispense Refill     clobetasol propionate (CLOBEX) 0.05 % SHAM Apply to dry scalp for 15 minutes, then rinse out.  Use once every 48 hours. 3 Bottle 2     clobetasol propionate (OLUX) 0.05 % FOAM Externally apply topically daily 100 g 5     fish oil-omega-3 fatty acids (FISH OIL) 1000 MG capsule Take 2 g by mouth daily.       Fluocinolone Acetonide (DERMA-SMOOTHE/FS SCALP) 0.01 % OIL Apply once weekly to scalp as needed for itching 1 Bottle 3     ketoconazole (NIZORAL) 2 % shampoo Lather into wet hair three times weekly.  Let sit 3-5 minutes before rinsing. 360 mL 4     Loratadine (CLARITIN PO) Take  by mouth as needed.       Magnesium 300 MG CAPS Take  by mouth.       methylPREDNISolone (MEDROL DOSEPAK) 4 MG tablet Follow package instructions 21 tablet 0     triamcinolone (KENALOG) 0.1 % ointment Apply topically 2 times daily 15 g 1     VITAMIN D, CHOLECALCIFEROL, PO Take by mouth daily       Allergies   Allergen Reactions     Trifluridine      PN: LW Reaction: CAUSES HERPES SYMPTOMS         Review of Systems:  -As per HPI  -Constitutional: The patient denies fatigue, fevers, chills, unintended weight loss, and night sweats.  -HEENT: Patient denies nonhealing oral sores.  -Skin: As above in HPI. No additional skin concerns.    Physical exam:  Vitals: BP 97/65 (Cuff Size: Adult Regular)  Pulse 68  Resp 18  GEN: This is a well developed, well-nourished female in no acute distress, in a pleasant mood.    SKIN: Focused examination of the scalp and face was performed.  -Diffuse thinning or hair in most areas, especially in the occipital and frontal scalp  -Hair regrowth layers   1st: 1-2 cm   2nd: 5 cm   3rd:  8 cm   4th:  12+ cm  -Negative hair pull  -Patchy pinkness throughout scalp with follicular accentuation at the vertex  -Eyebrows and lashes full  -No other lesions of concern on areas examined.     Impression/Plan:  1. Alopecia  areata  - diffuse thinning but good hair regrowth    Continue Clobex shampoo every other day, apply to dry scalp for 15 minutes, then rinse    Continue derma-Smoothe/FS oil then follow with ketoconazole the next morning  2. Seborrheic dermatitis    Continue shampooing every other day with ketoconazole 2%      Staff Involved:  Aline Hwang MD  Dermatology Research Fellow      Patient was seen and examined with the clinical research fellow. I agree with the history, review of systems, physical examination, assessments and plan.    Any Boyd MD  Professor and  Chair  Department of Dermatology  [facility]    Again, thank you for allowing me to participate in the care of your patient.      Sincerely,    Any Boyd MD

## 2018-10-18 NOTE — PROGRESS NOTES
[facility] Dermatology Note      Dermatology Problem List:  1. Alopecia areata  - diffuse thinning but good hair regrowth overall    Continue Clobex shampoo every other day, apply to dry scalp for 15 minutes, then rinse    Continue derma-Smoothe/FS oil then follow with ketoconazole the next morning  2. Seborrheic dermatitis    Continue shampooing every other day with ketoconazole 2%    Encounter Date: Oct 18, 2018    CC:  Chief Complaint   Patient presents with     Hair Loss     Jessica is here today for a hair loss follow up- notes some frontal loss.          History of Present Illness:  Ms. Jessica Marina is a 58 year old female who presents as a follow-up for alopecia areata. The patient was last seen 3/2018. She has noticed more thinning on the left parietal and right vertex scalp and continued thinning along the fronalt hairline. Currently she uses 2% ketoconazole and clobetasol shampoos alternating every 3 days, women's Rogaine foam every day, hair dye every 8 weeks, and derma-Smoothe oil/FS once weekly      Past Medical History:   Patient Active Problem List   Diagnosis     AA (alopecia areata)     Papules     Dermatitis, seborrheic     Autoimmune disease (H)     Pruritus     Dermatitis     Skin atrophy     History reviewed. No pertinent past medical history.  Past Surgical History:   Procedure Laterality Date     NO HISTORY OF SURGERY  12/16/13    derm       Social History:   reports that she has never smoked. She has never used smokeless tobacco.    Family History:  Family History   Problem Relation Age of Onset     Cancer No family hx of      no skin cancer     Melanoma No family hx of      Skin Cancer No family hx of        Medications:  Current Outpatient Prescriptions   Medication Sig Dispense Refill     clobetasol propionate (CLOBEX) 0.05 % SHAM Apply to dry scalp for 15 minutes, then rinse out.  Use once every 48 hours. 3 Bottle 2     clobetasol propionate (OLUX) 0.05 % FOAM  Externally apply topically daily 100 g 5     fish oil-omega-3 fatty acids (FISH OIL) 1000 MG capsule Take 2 g by mouth daily.       Fluocinolone Acetonide (DERMA-SMOOTHE/FS SCALP) 0.01 % OIL Apply once weekly to scalp as needed for itching 1 Bottle 3     ketoconazole (NIZORAL) 2 % shampoo Lather into wet hair three times weekly.  Let sit 3-5 minutes before rinsing. 360 mL 4     Loratadine (CLARITIN PO) Take  by mouth as needed.       Magnesium 300 MG CAPS Take  by mouth.       methylPREDNISolone (MEDROL DOSEPAK) 4 MG tablet Follow package instructions 21 tablet 0     triamcinolone (KENALOG) 0.1 % ointment Apply topically 2 times daily 15 g 1     VITAMIN D, CHOLECALCIFEROL, PO Take by mouth daily       Allergies   Allergen Reactions     Trifluridine      PN: LW Reaction: CAUSES HERPES SYMPTOMS         Review of Systems:  -As per HPI  -Constitutional: The patient denies fatigue, fevers, chills, unintended weight loss, and night sweats.  -HEENT: Patient denies nonhealing oral sores.  -Skin: As above in HPI. No additional skin concerns.    Physical exam:  Vitals: BP 97/65 (Cuff Size: Adult Regular)  Pulse 68  Resp 18  GEN: This is a well developed, well-nourished female in no acute distress, in a pleasant mood.    SKIN: Focused examination of the scalp and face was performed.  -Diffuse thinning or hair in most areas, especially in the occipital and frontal scalp  -Hair regrowth layers   1st: 1-2 cm   2nd: 5 cm   3rd:  8 cm   4th:  12+ cm  -Negative hair pull  -Patchy pinkness throughout scalp with follicular accentuation at the vertex  -Eyebrows and lashes full  -No other lesions of concern on areas examined.     Impression/Plan:  1. Alopecia areata  - diffuse thinning but good hair regrowth    Continue Clobex shampoo every other day, apply to dry scalp for 15 minutes, then rinse    Continue derma-Smoothe/FS oil then follow with ketoconazole the next morning  2. Seborrheic dermatitis    Continue shampooing every other  day with ketoconazole 2%      Staff Involved:  Aline Hwang MD  Dermatology Research Fellow      Patient was seen and examined with the clinical research fellow. I agree with the history, review of systems, physical examination, assessments and plan.    Any Boyd MD  Professor and  Chair  Department of Dermatology  [facility]

## 2018-10-18 NOTE — MR AVS SNAPSHOT
After Visit Summary   10/18/2018    Jessica Marina    MRN: [id number]           Patient Information     Date Of Birth          1960        Visit Information        Provider Department      10/18/2018 2:40 PM Any Boyd MD [facility] Dermatology        Today's Diagnoses     Dermatitis, seborrheic    -  1    Dermatitis        AA (alopecia areata)           Follow-ups after your visit        Your next 10 appointments already scheduled     Feb 19, 2019  2:00 PM CST   (Arrive by 1:45 PM)   RETURN HAIRLOSS with Any Boyd MD   [facility] Dermatology ([facility])    [address] [address] [phone]              Who to contact     Please call your clinic at [phone] to:    Ask questions about your health    Make or cancel appointments    Discuss your medicines    Learn about your test results    Speak to your doctor            Additional Information About Your Visit        [employer] Information     [employer] gives you secure access to your electronic health record. If you see a primary care provider, you can also send messages to your care team and make appointments. If you have questions, please call your primary care clinic.  If you do not have a primary care provider, please call [phone] and they will assist you.      [employer] is an electronic gateway that provides easy, online access to your medical records. With [employer], you can request a clinic appointment, read your test results, renew a prescription or communicate with your care team.     To access your existing account, please contact your [facility] or call [phone] for assistance.        Care EveryWhere ID     This is your Care EveryWhere ID. This could be used by other organizations to access your [facility] medical records  [id number]        Your Vitals Were     Pulse Respirations                68 18            Blood Pressure from Last 3 Encounters:   10/18/18 97/65   03/09/18 102/62   12/04/17 109/69    Weight from Last 3 Encounters:   05/31/16 54.7 kg (120 lb 9.6 oz)   12/07/15 53.5 kg (118 lb)   09/03/15 53 kg (116 lb 12.8 oz)              Today, you had the following     No orders found for display         Today's Medication Changes          These changes are accurate as of 10/18/18 11:59 PM.  If you have any questions, ask your nurse or doctor.               These medicines have changed or have updated prescriptions.        Dose/Directions    * DERMA-SMOOTHE/FS SCALP 0.01 % Oil oil   This may have changed:  Another medication with the same name was added. Make sure you understand how and when to take each.   Used for:  Seborrhoeic dermatitis   Generic drug:  Fluocinolone Acetonide Scalp   Changed by:  Any Boyd MD        Apply once weekly to scalp as needed for itching   Quantity:  1 Bottle   Refills:  3       * Fluocinolone Acetonide Scalp 0.01 % Oil oil   This may have changed:  You were already taking a medication with the same name, and this prescription was added. Make sure you understand how and when to take each.   Used for:  Dermatitis, seborrheic   Changed by:  Any Boyd MD        Apply 1-2 capfulls once weekly, leave overnight, shampoo with ketoconazole shampoo the next morning   Quantity:  1 Bottle   Refills:  1       * Notice:  This list has 2 medication(s) that are the same as other medications prescribed for you. Read the directions carefully, and ask your doctor or other care provider to review them with you.         Where to get your medicines      These medications were sent to [facility]/pharmacy #[id number] [place], [place] - [address] [address]  [address] [place] [address]     Phone:  [phone]     clobetasol propionate 0.05 % shampoo    Fluocinolone Acetonide Scalp 0.01 % Oil oil    ketoconazole 2 % shampoo                Primary  Care Provider    None Specified       No primary provider on file.        Equal Access to Services     CHAD TIRADO : Hadii jesi rivera marlinanel Greenwood, waromanalay benjamin, henrikmarielos machadokashiflay camilo, john robertsheronzonia schwarz. So [facility] [phone].    ATENCIÓN: Si habla español, tiene a larry disposición servicios gratuitos de asistencia lingüística. Llame al [phone].    We comply with applicable federal civil rights laws and [place] laws. We do not discriminate on the basis of race, color, national origin, age, disability, sex, sexual orientation, or gender identity.            Thank you!     Thank you for choosing [facility] DERMATOLOGY  for your care. Our goal is always to provide you with excellent care. Hearing back from our patients is one way we can continue to improve our services. Please take a few minutes to complete the written survey that you may receive in the mail after your visit with us. Thank you!             Your Updated Medication List - Protect others around you: Learn how to safely use, store and throw away your medicines at www.disposemymeds.org.          This list is accurate as of 10/18/18 11:59 PM.  Always use your most recent med list.                   Brand Name Dispense Instructions for use Diagnosis    CLARITIN PO      Take  by mouth as needed.        * clobetasol propionate 0.05 % foam    OLUX    100 g    Externally apply topically daily    AA (alopecia areata)       * clobetasol propionate 0.05 % shampoo    CLOBEX    3 Bottle    Apply to dry scalp for 15 minutes, then rinse out.  Use once every 48 hours.    Dermatitis       * DERMA-SMOOTHE/FS SCALP 0.01 % Oil oil   Generic drug:  Fluocinolone Acetonide Scalp     1 Bottle    Apply once weekly to scalp as needed for itching    Seborrhoeic dermatitis       * Fluocinolone Acetonide Scalp 0.01 % Oil oil     1 Bottle    Apply 1-2 capfulls once weekly, leave overnight, shampoo with ketoconazole shampoo the next morning     Dermatitis, seborrheic       fish oil-omega-3 fatty acids 1000 MG capsule      Take 2 g by mouth daily.        ketoconazole 2 % shampoo    NIZORAL    360 mL    Lather into wet hair three times weekly.  Let sit 3-5 minutes before rinsing.    Dermatitis       Magnesium 300 MG Caps      Take  by mouth.        methylPREDNISolone 4 MG tablet    MEDROL DOSEPAK    21 tablet    Follow package instructions    Alopecia areata       triamcinolone 0.1 % ointment    KENALOG    15 g    Apply topically 2 times daily    Dermatitis       VITAMIN D (CHOLECALCIFEROL) PO      Take by mouth daily        * Notice:  This list has 4 medication(s) that are the same as other medications prescribed for you. Read the directions carefully, and ask your doctor or other care provider to review them with you.

## 2018-10-18 NOTE — NURSING NOTE
Dermatology Rooming Note    Jessica Marina's goals for this visit include:   Chief Complaint   Patient presents with     Hair Loss     Jessica is here today for a hair loss follow up- notes some frontal loss.      MEGAN Garcia

## 2018-10-19 NOTE — TELEPHONE ENCOUNTER
Central Prior Authorization Team   Phone: [phone]    PA Initiation    Medication: clobetasol propionate (CLOBEX) 0.05 % SHAM  Insurance Company: [employer] - Phone [phone] Fax [phone]  Pharmacy [facility]illing the Rx: [id number] - [place], [place] - [address] [place] [address] [address]  Filling Pharmacy Phone: [phone]  Filling Pharmacy Fax:    Start Date: 10/19/2018

## 2018-10-30 NOTE — TELEPHONE ENCOUNTER
Prior Authorization Approval    Authorization Effective Date: 9/30/2018  Authorization Expiration Date: 10/30/2019  Medication: clobetasol propionate (CLOBEX) 0.05 % SHAM - approved  Approved Dose/Quantity:   Reference #: [id number]   Insurance Company: [employer] - Phone [phone] Fax [phone]  Expected CoPay: n/a     CoPay Card Available:      Foundation Assistance Needed:    Which Pharmacy is filling the prescription (Not needed for infusion/clinic administered): [facility]/PHARMACY #[id number] - [place], [place] - [facility] [address]  Pharmacy Notified: Yes  Patient Notified: Yes

## 2018-11-07 NOTE — TELEPHONE ENCOUNTER
Attempted to reach patient via phone, however, the number has been disconnected.   MEGAN Garcia

## 2019-09-30 ENCOUNTER — HEALTH MAINTENANCE LETTER (OUTPATIENT)
Age: 59
End: 2019-09-30

## 2019-10-23 ENCOUNTER — DOCUMENTATION ONLY (OUTPATIENT)
Dept: CARE COORDINATION | Facility: CLINIC | Age: 59
End: 2019-10-23

## 2019-11-22 ENCOUNTER — OFFICE VISIT (OUTPATIENT)
Dept: DERMATOLOGY | Facility: CLINIC | Age: 59
End: 2019-11-22
Payer: COMMERCIAL

## 2019-11-22 DIAGNOSIS — L30.9 DERMATITIS: ICD-10-CM

## 2019-11-22 DIAGNOSIS — L21.9 SEBORRHEIC DERMATITIS: ICD-10-CM

## 2019-11-22 DIAGNOSIS — L63.9 ALOPECIA AREATA: Primary | ICD-10-CM

## 2019-11-22 DIAGNOSIS — L63.9 AA (ALOPECIA AREATA): ICD-10-CM

## 2019-11-22 RX ORDER — CLOBETASOL PROPIONATE 0.05 G/100ML
SHAMPOO TOPICAL
Qty: 60 ML | Refills: 3 | Status: SHIPPED | OUTPATIENT
Start: 2019-11-22

## 2019-11-22 RX ORDER — CLOBETASOL PROPIONATE 0.5 MG/G
AEROSOL, FOAM TOPICAL DAILY
Qty: 100 G | Refills: 5 | Status: SHIPPED | OUTPATIENT
Start: 2019-11-22

## 2019-11-22 RX ORDER — KETOCONAZOLE 20 MG/ML
SHAMPOO TOPICAL
Qty: 360 ML | Refills: 4 | Status: SHIPPED | OUTPATIENT
Start: 2019-11-22

## 2019-11-22 RX ORDER — FLUOCINOLONE ACETONIDE 0.11 MG/ML
OIL TOPICAL
Qty: 1 BOTTLE | Refills: 3 | Status: SHIPPED | OUTPATIENT
Start: 2019-11-22

## 2019-11-22 ASSESSMENT — PAIN SCALES - GENERAL
PAINLEVEL: NO PAIN (1)
PAINLEVEL: NO PAIN (0)

## 2019-11-22 NOTE — LETTER
11/22/2019       RE: Jessica Marina  [address] [facility] [facility] [address]     Dear Colleague,    Thank you for referring your patient, Jessica Marina, to the [facility] DERMATOLOGY at [facility]. Please see a copy of my visit note below.    [facility] Dermatology Note      Dermatology Problem List:  1. Alopecia areata  - diffuse thinning but good hair regrowth overall  -Continue Clobex shampoo every other day, apply to dry scalp for 15 minutes, then rinse  -Continue derma-Smoothe/FS oil then follow with ketoconazole the next morning  -ILK 11/22/19  2. Seborrheic dermatitis  -Continue shampooing every other day with ketoconazole 2%    Encounter Date: Nov 22, 2019    CC:  Chief Complaint   Patient presents with     Derm Problem     Hairloss follow up , Jessica states her hair is doing well but notices heavy shedding after washing her hair.          History of Present Illness:  Ms. Jessica Marina is a 59 year old female who presents as a follow-up for alopecia areata. The patient was last seen 10/18/18 during which her scalp was stable. No changes made in her plan of care for her alopecia areata.      Patient reports that about 4 months ago she started noticing clumps of hair when she would wash hair and increased thinning of hair on temples, front and back. She reports no itch, burning, or scaling. She is currently treating scalp by alternating Clobex shampoo and ketoconazol and derma-Smoothe/FS oil  as needed. She gets her hair colored every 8 weeks. Patient reports that she recently changed jobs and has less stress but there are periods of high stress with associated loss of sleep. Patient reports no other concerns.    On ROS, patient denies fever, chills, n/v/d, fatigue, general weakness, diaphoresis, night sweats, changes in weight, HA, syncope, itching or tearing eyes, blurriness, hearing loss, chest pain, palpitations, cough, SOB, constipation,  bloody stools, hematuria, dysuria, polyuria, joint pain, or changes in stress, anxiety or mood.    Past Medical History:   Patient Active Problem List   Diagnosis     AA (alopecia areata)     Papules     Dermatitis, seborrheic     Autoimmune disease (H)     Pruritus     Dermatitis     Skin atrophy     No past medical history on file.  Past Surgical History:   Procedure Laterality Date     NO HISTORY OF SURGERY  12/16/13    derm       Social History:  Patient reports that she has never smoked. She has never used smokeless tobacco. 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 Hobbies include cooking, reading, and exercising.    Family History:  Family History   Problem Relation Age of Onset     Cancer No family hx of         no skin cancer     Melanoma No family hx of      Skin Cancer No family hx of        Medications:  Current Outpatient Medications   Medication Sig Dispense Refill     clobetasol propionate (CLOBEX) 0.05 % SHAM Apply to dry scalp for 15 minutes, then rinse out.  Use once every 48 hours. 3 Bottle 2     clobetasol propionate (OLUX) 0.05 % FOAM Externally apply topically daily 100 g 5     fish oil-omega-3 fatty acids (FISH OIL) 1000 MG capsule Take 2 g by mouth daily.       Fluocinolone Acetonide (DERMA-SMOOTHE/FS SCALP) 0.01 % OIL Apply once weekly to scalp as needed for itching 1 Bottle 3     ketoconazole (NIZORAL) 2 % shampoo Lather into wet hair three times weekly.  Let sit 3-5 minutes before rinsing. 360 mL 4     Loratadine (CLARITIN PO) Take  by mouth as needed.       Magnesium 300 MG CAPS Take  by mouth.       VITAMIN D, CHOLECALCIFEROL, PO Take by mouth daily       Fluocinolone Acetonide Scalp 0.01 % OIL oil Apply 1-2 capfulls once weekly, leave overnight, shampoo with ketoconazole shampoo the next morning 1 Bottle 1     methylPREDNISolone (MEDROL DOSEPAK) 4 MG tablet Follow package instructions 21 tablet 0     triamcinolone (KENALOG) 0.1 % ointment Apply  topically 2 times daily 15 g 1     Allergies   Allergen Reactions     Trifluridine      PN: LW Reaction: CAUSES HERPES SYMPTOMS         Review of Systems:  -As per HPI  -Constitutional: Otherwise feeling well today, in usual state of health.  -HEENT: Patient denies nonhealing oral sores.  -Skin: As above in HPI. No additional skin concerns.    Physical exam:  Vitals: There were no vitals taken for this visit.  GEN: This is a well developed, well-nourished female in no acute distress, in a pleasant mood.    SKIN: Focused examination of the scalp was performed.  -Acharya skin type: II  -thinning on ophiasis area  -negative pull test  -No other lesions of concern on areas examined.     Impression/Plan:  1. Alopecia areata    Kenalog intralesional injection procedure note (performed by faculty): After verbal consent and discussion of risks including but not limited to atrophy, pain, and bruising,  time out was performed, 2 total cc of Kenalog 10 mg/cc was injected into 20 sites on the frontal and temporal scalp.  The patient tolerated the procedure well and left the Dermatology clinic in good condition.    Continue Clobex shampoo every other day, apply to dry scalp for 15 minutes, then rinse    Continue derma-Smoothe/FS oil then follow with ketoconazole the next morning    2. Seborrheic dermatitis    Continue shampooing every other day with ketoconazole 2%    CC Referred Self, MD  No address on file on close of this encounter.  Follow-up in 1 months, earlier for new or changing lesions.     Staff Involved:  IAbhishek, MS3, saw and examined the patient in the presence of Dr. Hickey.    The medical student acted as scribe and the encounter documented above was performed by myself and accurately depicts my evaluation, diagnoses, decisions, treatment, and follow-up plans.  Any procedures documented in the student's note was performed by myself with the assistance of the medical student.    Erin Rosas  MD Ruperto, PhD  , Dermatology    Drug Administration Record    Prior to injection, verified patient identity using patient's name and date of birth.  Due to injection administration, patient instructed to remain in clinic for 15 minutes  afterwards, and to report any adverse reaction to me immediately.    Drug Name: triamcinolone acetonide(kenalog)  Dose: 2mL of triamcinolone 10mg/mL, 20mg dose  Route administered: ID  NDC #: sce0411: Kenalog-10 (8341-1318-90)  Amount of waste(mL):3  Reason for waste: Multi dose vial    LOT #: [id number]  SITE: see note  : [employer]  EXPIRATION DATE: 5/2021    Again, thank you for allowing me to participate in the care of your patient.      Sincerely,    Erin Hickey MD

## 2019-11-22 NOTE — PROGRESS NOTES
Drug Administration Record    Prior to injection, verified patient identity using patient's name and date of birth.  Due to injection administration, patient instructed to remain in clinic for 15 minutes  afterwards, and to report any adverse reaction to me immediately.    Drug Name: triamcinolone acetonide(kenalog)  Dose: 2mL of triamcinolone 10mg/mL, 20mg dose  Route administered: ID  NDC #: ssb1080: Kenalog-10 (5959-8432-64)  Amount of waste(mL):3  Reason for waste: Multi dose vial    LOT #: [id number]  SITE: see note  : [employer]  EXPIRATION DATE: 5/2021

## 2019-11-22 NOTE — PROGRESS NOTES
[facility] Dermatology Note      Dermatology Problem List:  1. Alopecia areata  - diffuse thinning but good hair regrowth overall  -Continue Clobex shampoo every other day, apply to dry scalp for 15 minutes, then rinse  -Continue derma-Smoothe/FS oil then follow with ketoconazole the next morning  -ILK 11/22/19  2. Seborrheic dermatitis  -Continue shampooing every other day with ketoconazole 2%    Encounter Date: Nov 22, 2019    CC:  Chief Complaint   Patient presents with     Derm Problem     Hairloss follow up , Jessica states her hair is doing well but notices heavy shedding after washing her hair.          History of Present Illness:  Ms. Jessica Marina is a 59 year old female who presents as a follow-up for alopecia areata. The patient was last seen 10/18/18 during which her scalp was stable. No changes made in her plan of care for her alopecia areata.      Patient reports that about 4 months ago she started noticing clumps of hair when she would wash hair and increased thinning of hair on temples, front and back. She reports no itch, burning, or scaling. She is currently treating scalp by alternating Clobex shampoo and ketoconazol and derma-Smoothe/FS oil as needed. She gets her hair colored every 8 weeks. Patient reports that she recently changed jobs and has less stress but there are periods of high stress with associated loss of sleep. Patient reports no other concerns.    On ROS, patient denies fever, chills, n/v/d, fatigue, general weakness, diaphoresis, night sweats, changes in weight, HA, syncope, itching or tearing eyes, blurriness, hearing loss, chest pain, palpitations, cough, SOB, constipation, bloody stools, hematuria, dysuria, polyuria, joint pain, or changes in stress, anxiety or mood.    Past Medical History:   Patient Active Problem List   Diagnosis     AA (alopecia areata)     Papules     Dermatitis, seborrheic     Autoimmune disease (H)     Pruritus     Dermatitis      Skin atrophy     No past medical history on file.  Past Surgical History:   Procedure Laterality Date     NO HISTORY OF SURGERY  12/16/13    derm       Social History:  Patient reports that she has never smoked. She has never used smokeless tobacco. 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 Hobbies include cooking, reading, and exercising.    Family History:  Family History   Problem Relation Age of Onset     Cancer No family hx of         no skin cancer     Melanoma No family hx of      Skin Cancer No family hx of        Medications:  Current Outpatient Medications   Medication Sig Dispense Refill     clobetasol propionate (CLOBEX) 0.05 % SHAM Apply to dry scalp for 15 minutes, then rinse out.  Use once every 48 hours. 3 Bottle 2     clobetasol propionate (OLUX) 0.05 % FOAM Externally apply topically daily 100 g 5     fish oil-omega-3 fatty acids (FISH OIL) 1000 MG capsule Take 2 g by mouth daily.       Fluocinolone Acetonide (DERMA-SMOOTHE/FS SCALP) 0.01 % OIL Apply once weekly to scalp as needed for itching 1 Bottle 3     ketoconazole (NIZORAL) 2 % shampoo Lather into wet hair three times weekly.  Let sit 3-5 minutes before rinsing. 360 mL 4     Loratadine (CLARITIN PO) Take  by mouth as needed.       Magnesium 300 MG CAPS Take  by mouth.       VITAMIN D, CHOLECALCIFEROL, PO Take by mouth daily       Fluocinolone Acetonide Scalp 0.01 % OIL oil Apply 1-2 capfulls once weekly, leave overnight, shampoo with ketoconazole shampoo the next morning 1 Bottle 1     methylPREDNISolone (MEDROL DOSEPAK) 4 MG tablet Follow package instructions 21 tablet 0     triamcinolone (KENALOG) 0.1 % ointment Apply topically 2 times daily 15 g 1     Allergies   Allergen Reactions     Trifluridine      PN: LW Reaction: CAUSES HERPES SYMPTOMS         Review of Systems:  -As per HPI  -Constitutional: Otherwise feeling well today, in usual state of health.  -HEENT: Patient denies nonhealing oral  sores.  -Skin: As above in HPI. No additional skin concerns.    Physical exam:  Vitals: There were no vitals taken for this visit.  GEN: This is a well developed, well-nourished female in no acute distress, in a pleasant mood.    SKIN: Focused examination of the scalp was performed.  -Acharya skin type: II  -thinning on ophiasis area  -negative pull test  -No other lesions of concern on areas examined.     Impression/Plan:  1. Alopecia areata    Kenalog intralesional injection procedure note (performed by faculty): After verbal consent and discussion of risks including but not limited to atrophy, pain, and bruising,  time out was performed, 2 total cc of Kenalog 10 mg/cc was injected into 20 sites on the frontal and temporal scalp.  The patient tolerated the procedure well and left the Dermatology clinic in good condition.    Continue Clobex shampoo every other day, apply to dry scalp for 15 minutes, then rinse    Continue derma-Smoothe/FS oil then follow with ketoconazole the next morning    2. Seborrheic dermatitis    Continue shampooing every other day with ketoconazole 2%    CC Referred Self, MD  No address on file on close of this encounter.  Follow-up in 1 months, earlier for new or changing lesions.     Staff Involved:  I,Abhishek George, MS3, saw and examined the patient in the presence of Dr. Hickey.    The medical student acted as scribe and the encounter documented above was performed by myself and accurately depicts my evaluation, diagnoses, decisions, treatment, and follow-up plans.  Any procedures documented in the student's note was performed by myself with the assistance of the medical student.    Erin Hickey MD, PhD  , Dermatology

## 2019-11-22 NOTE — NURSING NOTE
Dermatology Rooming Note    Jessica Marina's goals for this visit include:   Chief Complaint   Patient presents with     Derm Problem     Hairloss follow up , Jessica states her hair is doing well but notices heavy shedding after washing her hair.      Maribel Washington LPN

## 2019-12-20 ENCOUNTER — TELEPHONE (OUTPATIENT)
Dept: DERMATOLOGY | Facility: CLINIC | Age: 59
End: 2019-12-20

## 2019-12-20 NOTE — TELEPHONE ENCOUNTER
Central Prior Authorization Team   Phone: [phone]      PA Initiation    Medication: Clobetasol 0.05% shampoo - PA initiatied  Insurance Company: [employer] - Phone [phone] Fax [phone]  [facility] [address] [address]  Filling Pharmacy Phone: [phone]  Filling Pharmacy Fax:    Start Date: 12/20/2019

## 2019-12-20 NOTE — TELEPHONE ENCOUNTER
Prior Authorization Retail Medication Request    Medication/Dose: clobetasol 0.05 % shampoo  ICD code (if different than what is on RX):    Previously Tried and Failed:    Rationale:      Insurance Name:    Coverage information:     Subscriber: [id number] LADARIUS ROCHA     Rel to sub: 01 - Self     Member ID: [id number]     Payor: [employer] Ph: [phone]     Benefit plan: [employer] OPEN ACCESS Ph: [phone]     Group number: [id number]     Member effective dates: from 02/01/18            Insurance ID:        Pharmacy Information (if different than what is on RX)  Name: [facility]   Phone:  [phone]

## 2021-01-15 ENCOUNTER — HEALTH MAINTENANCE LETTER (OUTPATIENT)
Age: 61
End: 2021-01-15

## 2021-10-24 ENCOUNTER — HEALTH MAINTENANCE LETTER (OUTPATIENT)
Age: 61
End: 2021-10-24

## 2021-12-19 ENCOUNTER — HEALTH MAINTENANCE LETTER (OUTPATIENT)
Age: 61
End: 2021-12-19

## 2022-02-13 ENCOUNTER — HEALTH MAINTENANCE LETTER (OUTPATIENT)
Age: 62
End: 2022-02-13

## 2022-10-15 ENCOUNTER — HEALTH MAINTENANCE LETTER (OUTPATIENT)
Age: 62
End: 2022-10-15

## 2023-03-26 ENCOUNTER — HEALTH MAINTENANCE LETTER (OUTPATIENT)
Age: 63
End: 2023-03-26

## 2024-01-07 ENCOUNTER — HEALTH MAINTENANCE LETTER (OUTPATIENT)
Age: 64
End: 2024-01-07